# Patient Record
Sex: MALE | Race: WHITE
[De-identification: names, ages, dates, MRNs, and addresses within clinical notes are randomized per-mention and may not be internally consistent; named-entity substitution may affect disease eponyms.]

---

## 2021-02-01 ENCOUNTER — HOSPITAL ENCOUNTER (EMERGENCY)
Dept: HOSPITAL 53 - M ED | Age: 46
Discharge: HOME | End: 2021-02-01
Payer: COMMERCIAL

## 2021-02-01 VITALS — SYSTOLIC BLOOD PRESSURE: 140 MMHG | DIASTOLIC BLOOD PRESSURE: 65 MMHG

## 2021-02-01 DIAGNOSIS — Z79.899: ICD-10-CM

## 2021-02-01 DIAGNOSIS — F33.9: ICD-10-CM

## 2021-02-01 DIAGNOSIS — F41.9: ICD-10-CM

## 2021-02-01 DIAGNOSIS — R39.11: Primary | ICD-10-CM

## 2021-02-01 NOTE — CCD
Summarization Of Episode

                             Created on: 2021



ANITHA SILVER

External Reference #: [id number]

: 1975

Sex: Male



Demographics





                          Address                   [address] [place] [address]

 

                          Home Phone                [phone]

 

                          Preferred Language        English

 

                          Marital Status            

 

                          Temple Affiliation     [place]

 

                          Race                      White

 

                          Ethnic Group              Not  or 





Author





                          Author                    [facility]

 

                          Organization              [facility]

 

                          Address                   Unknown

 

                          Phone                     Unavailable







Support





                Name            Relationship    Address         Phone

 

                DISABLED        Next Of Kin     Unknown         Unavailable

 

                    SONNY SILVER Next Of Kin         [address] [place]  [address]                    [phone]

 

                UE              Next Of Kin     Unknown         Unavailable

 

                    SAPPHIRE SILVER    Next Of Kin         [address] [place]  [address]                    [phone]

 

                    ANJALI SILVER      Next Of Kin         [address] [place]  [address]                    [phone]

 

                    BHAVANI SILVER                [address] [place]  [address]                    Unavailable







Care Team Providers





                    Care Team Member Name Role                Phone

 

                    SYMENOW, G CHRISTOPHER PA Unavailable         Unavailable

 

                    SYMENOW, G CHRISTOPHER PA Unavailable         Unavailable

 

                    SYMENOW, G CHRISTOPHER PA Unavailable         Unavailable

 

                    SYMENOW, G CHRISTOPHER PA Unavailable         Unavailable

 

                    SYMENOW, G CHRISTOPHER PA Unavailable         Unavailable

 

                    SYMENOW, G CHRISTOPHER PA Unavailable         Unavailable

 

                    SYMENOW, G CHRISTOPHER PA Unavailable         Unavailable

 

                    SYMENOW, G CHRISTOPHER PA Unavailable         Unavailable

 

                    SYMENOW, G CHRISTOPHER PA Unavailable         Unavailable

 

                    SYMENOW, G CHRISTOPHER PA Unavailable         Unavailable

 

                    SYMENOW, G CHRISTOPHER PA Unavailable         Unavailable

 

                    SYMENOW, G CHRISTOPHER PA Unavailable         Unavailable

 

                    SYMENOW, G CHRISTOPHER PA Unavailable         Unavailable

 

                    SYMENOW, G CHRISTOPHER PA Unavailable         Unavailable

 

                    SYMENOW, G CHRISTOPHER PA Unavailable         Unavailable

 

                    SYMENOW, G CHRISTOPHER PA Unavailable         Unavailable

 

                    SYMENOW, G CHRISTOPHER PA Unavailable         Unavailable

 

                    HODANEVA PA Unavailable         Unavailable

 

                    HODANEVA PA Unavailable         Unavailable

 

                    HODANEVA PA Unavailable         Unavailable

 

                    HODANEVA PA Unavailable         Unavailable

 

                    HODANEVA PA Unavailable         Unavailable

 

                    HODANEVA PA Unavailable         Unavailable

 

                    HODANEVA PA Unavailable         Unavailable

 

                    HODANEVA PA Unavailable         Unavailable

 

                    HODANEVA PA Unavailable         Unavailable

 

                    HODANEVA PA Unavailable         Unavailable

 

                    HODANEVA PA Unavailable         Unavailable

 

                    HODANEVA PA Unavailable         Unavailable

 

                    HODANEVA PA Unavailable         Unavailable

 

                    HODAN, L TOM PA Unavailable         Unavailable

 

                    HODAN, L TOM PA Unavailable         Unavailable

 

                    HODAN, L TOM PA Unavailable         Unavailable

 

                    HODAN, L TOM PA Unavailable         Unavailable

 

                    HODAN, L TOM PA Unavailable         Unavailable

 

                    HODAN, L TOM PA Unavailable         Unavailable

 

                    DOMINGO,  RUBY PA  Unavailable         Unavailable

 

                    DOMINGO,  RUBY PA  Unavailable         Unavailable

 

                    DOMINGO,  RUBY PA  Unavailable         Unavailable

 

                    DOMINGO,  RUBY PA  Unavailable         Unavailable

 

                    DOMINGO,  RUBY PA  Unavailable         Unavailable

 

                    DOMINGO,  RUBY PA  Unavailable         Unavailable

 

                    DOMINGO,  RUBY PA  Unavailable         Unavailable

 

                    DOMINGO,  RUBY PA  Unavailable         Unavailable

 

                    DOMINGO,  RUBY PA  Unavailable         Unavailable

 

                    DOMINGO,  RUBY PA  Unavailable         Unavailable

 

                    DOMINGO,  RUBY PA  Unavailable         Unavailable

 

                    DOMINGO,  RUBY PA  Unavailable         Unavailable

 

                    DOMINGO,  RUBY PA  Unavailable         Unavailable

 

                    DOMINGO,  RUBY PA  Unavailable         Unavailable

 

                    DOMINGO,  RUBY PA  Unavailable         Unavailable

 

                    EVA KASPER MD Unavailable         Unavailable

 

                    EVA KASPER MD Unavailable         Unavailable

 

                    EVA KASPER MD Unavailable         Unavailable

 

                    MARIE, W SCOTT PA Unavailable         Unavailable

 

                    MARIE, W SCOTT PA Unavailable         Unavailable

 

                    MARIE, W SCOTT PA Unavailable         Unavailable

 

                    MARIE, W SCOTT PA Unavailable         Unavailable

 

                    MARIE, W SCOTT PA Unavailable         Unavailable

 

                    MARIE, W SCOTT PA Unavailable         Unavailable

 

                    MARIE, W SCOTT PA Unavailable         Unavailable

 

                    MARIE, W SCOTT PA Unavailable         Unavailable

 

                    MARIE, W SCOTT PA Unavailable         Unavailable

 

                    MARIE, W SCOTT PA Unavailable         Unavailable

 

                    MARIE, W SCOTT PA Unavailable         Unavailable

 

                    MARIE, W SCOTT PA Unavailable         Unavailable

 

                    MARIE, W SCOTT PA Unavailable         Unavailable

 

                    MARIE, W SCOTT PA Unavailable         Unavailable

 

                    MARIE, W SCOTT PA Unavailable         Unavailable

 

                    MARIE, W SCOTT PA Unavailable         Unavailable

 

                    MARIE, W SCOTT PA Unavailable         Unavailable

 

                    MARIE, W SCOTT PA Unavailable         Unavailable

 

                    MARIE, W SCOTT PA Unavailable         Unavailable

 

                    MARIE, W SCOTT PA Unavailable         Unavailable

 

                    MARIE, W SCOTT PA Unavailable         Unavailable

 

                    MARIE, W SCOTT PA Unavailable         Unavailable

 

                    MARIE, W SCOTT PA Unavailable         Unavailable

 

                    MARIE, W SCOTT PA Unavailable         Unavailable

 

                    MARIE, W SCOTT PA Unavailable         Unavailable

 

                    MARIE, W SCOTT PA Unavailable         Unavailable

 

                    MARIE, W SCOTT PA Unavailable         Unavailable

 

                    MARIE, W SCOTT PA Unavailable         Unavailable

 

                    MAIRE, W SCOTT PA Unavailable         Unavailable

 

                    MARIE, W SCOTT PA Unavailable         Unavailable

 

                    MARIE, W SCOTT PA Unavailable         Unavailable

 

                    MARIE, W SCOTT PA Unavailable         Unavailable

 

                    MARIE, W SCOTT PA Unavailable         Unavailable

 

                    MARIE, W SCOTT PA Unavailable         Unavailable

 

                    MARIE, W SCOTT PA Unavailable         Unavailable

 

                    MARIE, W SCOTT PA Unavailable         Unavailable

 

                    MARIE, W SCOTT PA Unavailable         Unavailable

 

                    MARIE, W SCOTT PA Unavailable         Unavailable

 

                    MARIE, W SCOTT PA Unavailable         Unavailable

 

                    MARIE, W SCOTT PA Unavailable         Unavailable

 

                    MARIE, W SCOTT PA Unavailable         Unavailable

 

                    MARIE, W SCOTT PA Unavailable         Unavailable

 

                    MARIE, W SCOTT PA Unavailable         Unavailable

 

                    MARIE, W SCOTT PA Unavailable         Unavailable

 

                    MARIE, W SCOTT PA Unavailable         Unavailable

 

                    MARIE, W SCOTT PA Unavailable         Unavailable

 

                    ASHLY, LUIS TYLER PA Unavailable         Unavailable

 

                    ASHLY, LUIS TYLER PA Unavailable         Unavailable

 

                    ASHLY, LUIS TYLER PA Unavailable         Unavailable

 

                    ASHLY, LUIS TYLER PA Unavailable         Unavailable

 

                    ASHLY, LUIS TYLER PA Unavailable         Unavailable

 

                    ASHLY, LUIS TYLER PA Unavailable         Unavailable

 

                    ASHLY, LUIS TYLER PA Unavailable         Unavailable

 

                    ASHLY, LUIS TYLER PA Unavailable         Unavailable

 

                    ASHLY, LUIS TYLER PA Unavailable         Unavailable

 

                    ASHLY, LUIS TYLER PA Unavailable         Unavailable

 

                    ASHLY, LUIS TYLER PA Unavailable         Unavailable

 

                    ASHLY, LUIS TYLER PA Unavailable         Unavailable

 

                    ASHLY, LUIS TYLER PA Unavailable         Unavailable

 

                    ASHLY, LUIS TYLER PA Unavailable         Unavailable

 

                    ASHLY, LUIS TYLER PA Unavailable         Unavailable

 

                    ASHLY, LUIS TYLER PA Unavailable         Unavailable

 

                    ASHLY, LUIS TYLER PA Unavailable         Unavailable

 

                    ASHLY, LUIS TYLER PA Unavailable         Unavailable

 

                    ASHLY, LUIS TYLER PA Unavailable         Unavailable

 

                    ASHLY, LUIS TYLER PA Unavailable         Unavailable

 

                    ASHLY, LUIS TYLER PA Unavailable         Unavailable

 

                    TACHO KHAN MD    Unavailable         Unavailable

 

                    TACHO KHAN MD    Unavailable         Unavailable

 

                    TACHO KHAN MD    Unavailable         Unavailable

 

                    TACHO KHAN MD    Unavailable         Unavailable

 

                    TACHO KHAN MD    Unavailable         Unavailable

 

                    TACHO KHAN MD    Unavailable         Unavailable

 

                    TACHO KHAN MD    Unavailable         Unavailable

 

                    TACHO KHAN MD    Unavailable         Unavailable

 

                    TACHO KHAN MD    Unavailable         Unavailable

 

                    OLIVAREZ SR, ANITHA CABRAL MD Unavailable         Unavailable

 

                    OLIVAREZ SR, ANITHA CABRAL MD Unavailable         Unavailable

 

                    OLIVAREZ SR, ANITHA CABRAL MD Unavailable         Unavailable

 

                    OLIVAREZ SR, ANITHA CABRAL MD Unavailable         Unavailable

 

                    OLIVAREZ SR, ANITHA CABRAL MD Unavailable         Unavailable

 

                    OLIVAREZ SR, ANITHA CABRAL MD Unavailable         Unavailable

 

                    OLIVAREZ SR, ANITHA CABRAL MD Unavailable         Unavailable

 

                    OLIVAREZ SR, ANITHA CABRAL MD Unavailable         Unavailable

 

                    OLIVAREZ SR, ANITHA CABRAL MD Unavailable         Unavailable

 

                    OLIVAREZ SR, ANITHA CABRAL MD Unavailable         Unavailable

 

                    OLIVAREZ SR, ANITHA CABRAL MD Unavailable         Unavailable

 

                    OLIVAREZ SR, ANITHA CABRAL MD Unavailable         Unavailable

 

                    OLIVAREZ SR, ANITHA CABRAL MD Unavailable         Unavailable

 

                    OLIVAREZ SR, ANITHA CABRAL MD Unavailable         Unavailable

 

                    OLIVAREZ SR, ANITHA CABRAL MD Unavailable         Unavailable

 

                    OLIVAREZ SR, ANITHA CABRAL MD Unavailable         Unavailable

 

                    OLIVAREZ SR, ANITHA CABRAL MD Unavailable         Unavailable

 

                    OLIVAREZ SR, ANITHA CABRAL MD Unavailable         Unavailable

 

                    OLIVAREZ SR, ANITHA CABRAL MD Unavailable         Unavailable

 

                    OLIVAREZ SR, ANITHA CABRAL MD Unavailable         Unavailable

 

                    OLIVAREZ SR, ANITHA CABRAL MD Unavailable         Unavailable

 

                    OLIVAREZ SR, ANITHA CABRAL MD Unavailable         Unavailable

 

                    OLIVAREZ SR, ANITHA CABRAL MD Unavailable         Unavailable

 

                    OLIVAREZ SR, ANITHA CABRAL MD Unavailable         Unavailable

 

                    OLIVAREZ SR, ANITHA CABRAL MD Unavailable         Unavailable

 

                    OLIVAREZ SR, ANITHA CABRAL MD Unavailable         Unavailable

 

                    OLIVAREZ SR, ANITHA CABRAL MD Unavailable         Unavailable

 

                    OLIVAREZ SR, ANITHA CABRAL MD Unavailable         Unavailable

 

                    OLIVAREZ SR, ANITHA CABRAL MD Unavailable         Unavailable

 

                    OLIVAREZ SR, ANITHA CABRAL MD Unavailable         Unavailable

 

                    OLIVAREZ SR, ANITHA CABRAL MD Unavailable         Unavailable

 

                    OLIVAREZ SR, ANITHA CABRAL MD Unavailable         Unavailable

 

                    OLIVAREZ SR, ANITHA CABRAL MD Unavailable         Unavailable

 

                    OLIVAREZ SR, ANITHA CABRAL MD Unavailable         Unavailable

 

                    OLIVAREZ SR, ANITHA CABRAL MD Unavailable         Unavailable

 

                    OLIVAREZ SR, ANITHA CABRAL MD Unavailable         Unavailable

 

                    OLIVAREZ SR, ANITHA CABRAL MD Unavailable         Unavailable

 

                    OLIVAREZ SR, ANITHA CABRAL MD Unavailable         Unavailable

 

                    OLIVAREZ SR, ANITHA CABRAL MD Unavailable         Unavailable

 

                    OLIVAREZ SR, ANITHA CABRAL MD Unavailable         Unavailable

 

                    OLIVAREZ SR, ANITHA CABRAL MD Unavailable         Unavailable

 

                    OLIVAREZ SR, ANITHA CABRAL MD Unavailable         Unavailable

 

                    OLIVAREZ SR, ANITHA CABRAL MD Unavailable         Unavailable

 

                    OLIVAREZ SR, ANITHA CABRAL MD Unavailable         Unavailable

 

                    OLIVAREZ SR, ANITHA CABRAL MD Unavailable         Unavailable

 

                    OLIVAREZ SR, ANITHA CABRAL MD Unavailable         Unavailable

 

                    OLIVAREZ SR, ANITHA CABRAL MD Unavailable         Unavailable

 

                    OLIVAREZ SR, ANITHA CABRAL MD Unavailable         Unavailable

 

                    OLIVAREZ SR, ANITHA CABRAL MD Unavailable         Unavailable

 

                    OLIVAREZ SR, ANITHA CABRAL MD Unavailable         Unavailable

 

                    OLIVAREZ SR, ANITHA CABRAL MD Unavailable         Unavailable

 

                    OLIVAREZ SR, ANITHA CABRAL MD Unavailable         Unavailable

 

                    OLIVAREZ SR, ANITHA CABRAL MD Unavailable         Unavailable

 

                    OLIVAREZ SR, ANITHA CABRAL MD Unavailable         Unavailable

 

                    Braxton, M Breana RPA   Unavailable         Unavailable

 

                    Braxton, M Breana RPA   Unavailable         Unavailable

 

                    Braxton, M Breana RPA   Unavailable         Unavailable

 

                    Braxton, M Breana RPA   Unavailable         Unavailable

 

                    Braxton, M Breana RPA   Unavailable         Unavailable

 

                    Braxton, M Breana RPA   Unavailable         Unavailable

 

                    Braxton, M Breana RPA   Unavailable         Unavailable

 

                    Braxton, M Breana RPA   Unavailable         Unavailable

 

                    Braxton, M Breana RPA   Unavailable         Unavailable

 

                    Braxton, M Breana RPA   Unavailable         Unavailable

 

                    Braxton, M Breana RPA   Unavailable         Unavailable

 

                    Braxton, M Breana RPA   Unavailable         Unavailable

 

                    Braxton, M Breana RPA   Unavailable         Unavailable

 

                    Braxton, M Breana RPA   Unavailable         Unavailable

 

                    Braxton, M Breana RPA   Unavailable         Unavailable

 

                    Braxton, M Breana RPA   Unavailable         Unavailable

 

                    Braxton, M Breana RPA   Unavailable         Unavailable

 

                    Braxton, M Breana RPA   Unavailable         Unavailable

 

                    Braxton, M Breana RPA   Unavailable         Unavailable

 

                    Braxton, M Breana RPA   Unavailable         Unavailable

 

                    Braxton, M Breana RPA   Unavailable         Unavailable

 

                    Braxton, M Breana RPA   Unavailable         Unavailable

 

                    Braxton, M Breana RPA   Unavailable         Unavailable

 

                    Braxton, M Breana RPA   Unavailable         Unavailable

 

                    Braxton, M Breana RPA   Unavailable         Unavailable

 

                    Braxton, M Breana RPA   Unavailable         Unavailable

 

                    Braxton, M Breana RPA   Unavailable         Unavailable

 

                    Braxton, M Breana RPA   Unavailable         Unavailable

 

                    Braxton, M Breana RPA   Unavailable         Unavailable

 

                    Braxton, M Breana RPA   Unavailable         Unavailable

 

                    Braxton, M Breana RPA   Unavailable         Unavailable

 

                    Braxton, M Breana RPA   Unavailable         Unavailable

 

                    Braxton, M Breana RPA   Unavailable         Unavailable

 

                    Braxton, M Breana RPA   Unavailable         Unavailable

 

                    Braxton, M Breana RPA   Unavailable         Unavailable

 

                    Braxton, M Breana RPA   Unavailable         Unavailable

 

                    Braxton, M Breana RPA   Unavailable         Unavailable

 

                    Braxton, M Breana RPA   Unavailable         Unavailable

 

                    Braxton, M Breana RPA   Unavailable         Unavailable

 

                    Braxton, M Breana RPA   Unavailable         Unavailable

 

                    Braxton, M Breana RPA   Unavailable         Unavailable

 

                    JOS VALENTINE MD Unavailable         Unavailable

 

                    JOS VALENTINE MD Unavailable         Unavailable

 

                    JOS VALENTINE MD Unavailable         Unavailable

 

                    JOS VALENTINE MD Unavailable         Unavailable

 

                    JOS VALENTINE MD Unavailable         Unavailable

 

                    JOS VALENTINE MD Unavailable         Unavailable

 

                    JOS VALENTINE MD Unavailable         Unavailable

 

                    JOS VALENTINE MD Unavailable         Unavailable

 

                    JOS VALENTINE MD Unavailable         Unavailable

 

                    JOS VALENTINE MD Unavailable         Unavailable

 

                    MEEK, SYED NICOLE FNP-C, MSN Unavailable         Unavailab

le

 

                    MEEK, SYED NICOLE FNP-C, MSN Unavailable         Unavailab

le

 

                    MEEK, SYED NICOLE FNP-C, MSN Unavailable         Unavailab

le

 

                    MEEK, SYED NICOLE FNP-C, MSN Unavailable         Unavailab

le

 

                    MEEK, SYED NICOLE FNP-C, MSN Unavailable         Unavailab

le

 

                    MEEK, SYED NICOLE FNP-C, MSN Unavailable         Unavailab

le

 

                    MEEK, SYED NICOLE FNP-C, MSN Unavailable         Unavailab

le

 

                    MEEK, SYED NICOLE FNP-C, MSN Unavailable         Unavailab

le

 

                    MEEK, SYED NICOLE FNP-C, MSN Unavailable         Unavailab

le

 

                    MEEK, SYED NICOLE FNP-C, MSN Unavailable         Unavailab

le

 

                    MEEK, SYED NICOLE FNP-C, MSN Unavailable         Unavailab

le

 

                    MEEK, SYED NICOLE FNP-C, MSN Unavailable         Unavailab

le

 

                    MEEK, SYED NICOLE FNP-C, MSN Unavailable         Unavailab

le

 

                    MEEK, SYED NICOLE FNP-C, MSN Unavailable         Unavailab

le

 

                    MEEK, SYED NICOLE FNP-C, MSN Unavailable         Unavailab

le

 

                    MEEK, SYED NICOLE FNP-C, MSN Unavailable         Unavailab

le

 

                    MEEK, SYED NICOLE FNP-C, MSN Unavailable         Unavailab

le

 

                    MEEK, SYED NICOLE FNP-C, MSN Unavailable         Unavailab

le

 

                    MEEK, SYED NICOLE FNP-C, MSN Unavailable         Unavailab

le

 

                    MEEK, SYED NICOLE FNP-C, MSN Unavailable         Unavailab

le

 

                    MEEK, SYED NICOLE FNP-C, MSN Unavailable         Unavailab

le

 

                    MEEK, SYED NICOLE FNP-C, MSN Unavailable         Unavailab

le

 

                    MEEK, SYED NICOLE FNP-C, MSN Unavailable         Unavailab

le

 

                    MEEK, SYED NICOLE FNP-C, MSN Unavailable         Unavailab

le

 

                    MEEK, SYED NICOLE FNP-C, MSN Unavailable         Unavailab

le

 

                    MEEK, SYED NICOLE FNP-C, MSN Unavailable         Unavailab

le

 

                    MEEK, SYED NICOLE FNP-C, MSN Unavailable         Unavailab

le

 

                    MEEK, SYED NICOLE FNP-C, MSN Unavailable         Unavailab

le

 

                    MEEK, SYED NICOLE FNP-C, MSN Unavailable         Unavailab

le

 

                    MEEK, SYED NICOLE FNP-C, MSN Unavailable         Unavailab

le

 

                    MEEK, SYED NICOLE FNP-C, MSN Unavailable         Unavailab

le

 

                    MEEK, SYED NICOLE FNP-C, MSN Unavailable         Unavailab

le

 

                    MEEK, SYED NICOLE FNP-C, MSN Unavailable         Unavailab

le

 

                    MEEK, SYED NICOLE FNP-C, MSN Unavailable         Unavailab

le

 

                    MEEK, SYED NICOLE FNP-C, MSN Unavailable         Unavailab

le

 

                    MEEK, SYED NICOLE FNP-C, MSN Unavailable         Unavailab

le

 

                    MEEK, SYED NICOLE FNP-C, MSN Unavailable         Unavailab

le

 

                    MEEK, SYED NICOLE FNP-C, MSN Unavailable         Unavailab

le

 

                    MEEKSYED GAMBINOA FNP-C, MSN Unavailable         Unavailab

le

 

                    MEEKSYED GAMBINOA FNP-C, MSN Unavailable         Unavailab

le

 

                    MEEK, SYED JORDANA FNP-C, MSN Unavailable         Unavailab

le

 

                    MEEK, SYED JORDANA FNP-C, MSN Unavailable         Unavailab

le

 

                    MEEK, SYED JORDANA FNP-C, MSN Unavailable         Unavailab

le

 

                    ABBY, DUANE THOMAS PA-C Unavailable         Unavailable

 

                    ABBY, DUANE THOMAS PA-C Unavailable         Unavailable

 

                    ABBY, DUANE THOMAS PA-C Unavailable         Unavailable

 

                    ABBY, DUANE THOMAS PA-C Unavailable         Unavailable

 

                    ABBY, DUANE THOMAS PA-C Unavailable         Unavailable

 

                    ABBY, DUANE THOMAS PA-C Unavailable         Unavailable

 

                    ABBY, DUANE THOMAS PA-C Unavailable         Unavailable

 

                    ABBY, DUANE THOMAS PA-C Unavailable         Unavailable

 

                    ABBY, DUANE THOMAS PA-C Unavailable         Unavailable

 

                    [facility]        Unavailable         Unavailable

 

                    DESJARLAIS,  GEORGE NP Unavailable         Unavailable

 

                    DESJARLAIS,  GEORGE NP Unavailable         Unavailable

 

                    DESJARLAIS,  GEORGE NP Unavailable         Unavailable

 

                    DESJARLAIS,  GEORGE NP Unavailable         Unavailable

 

                    DESJARLAIS,  GEORGE NP Unavailable         Unavailable

 

                    DESJARLAIS,  GEORGE NP Unavailable         Unavailable

 

                    DESJARLAIS,  GEORGE NP Unavailable         Unavailable

 

                    DESJARLAIS,  GEORGE NP Unavailable         Unavailable

 

                    DESJARLAIS,  GEORGE NP Unavailable         Unavailable

 

                    Rydberg,  Anahi PA Unavailable         Unavailable

 

                    Rydberg,  Anahi PA Unavailable         Unavailable

 

                    Rydberg,  Anahi PA Unavailable         Unavailable

 

                    Rydberg,  Anahi PA Unavailable         Unavailable

 

                    Rydberg,  Anahi PA Unavailable         Unavailable

 

                    Rydberg,  Anahi PA Unavailable         Unavailable

 

                    Rydberg,  Anahi PA Unavailable         Unavailable

 

                    Rydberg,  Anahi PA Unavailable         Unavailable

 

                    Rydberg,  Anahi PA Unavailable         Unavailable

 

                    Rydberg,  Anahi PA Unavailable         Unavailable

 

                    Rydberg,  Anahi PA Unavailable         Unavailable

 

                    Rydberg,  Anahi PA Unavailable         Unavailable

 

                    Rydberg,  Anahi PA Unavailable         Unavailable

 

                    Rydberg,  Anahi PA Unavailable         Unavailable

 

                    Rydberg,  Anahi PA Unavailable         Unavailable

 

                    Rydberg,  Anahi PA Unavailable         Unavailable

 

                    Rydberg,  Anahi PA Unavailable         Unavailable

 

                    Rydberg,  Anahi PA Unavailable         Unavailable

 

                    Rydberg,  Anahi PA Unavailable         Unavailable

 

                    Rydberg,  Anahi PA Unavailable         Unavailable

 

                    Rydberg,  Anahi PA Unavailable         Unavailable

 

                    Rydberg,  Anahi PA Unavailable         Unavailable



                                  



Re-disclosure Warning

          The records that you are about to access may contain information from 
federally-assisted alcohol or drug abuse programs. If such information is 
present, then the following federally mandated warning applies: This information
has been disclosed to you from records protected by federal confidentiality 
rules (42 CFR part 2). The federal rules prohibit you from making any further 
disclosure of this information unless further disclosure is expressly permitted 
by the written consent of the person to whom it pertains or as otherwise 
permitted by 42 CFR part 2. A general authorization for the release of medical 
or other information is NOT sufficient for this purpose. The Federal rules 
restrict any use of the information to criminally investigate or prosecute any 
alcohol or drug abuse patient.The records that you are about to access may 
contain highly sensitive health information, the redisclosure of which is 
protected by Article 27-F of the [facility] Public Health law. If you 
continue you may have access to information: Regarding HIV / AIDS; Provided by 
facilities licensed or operated by the [facility] Office of Mental Health; 
or Provided by the [facility] Office for People With Developmental 
Disabilities. If such information is present, then the following [place] 
mandated warning applies: This information has been disclosed to you from 
confidential records which are protected by state law. State law prohibits you 
from making any further disclosure of this information without the specific 
written consent of the person to whom it pertains, or as otherwise permitted by 
law. Any unauthorized further disclosure in violation of state law may result in
a fine or [facility] sentence or both. A general authorization for the release of 
medical or other information is NOT sufficient authorization for further disc
losure.                                                                         
    



Allergies and Adverse Reactions

          



           Type       Description Substance  Reaction   Status     Data Source(s

)

 

           Drug allergy MDX - Nka - No Known Allergies MDX - Nka - No Known [facility]



                                                                                
       



Encounters

          



           Encounter  Providers  Location   Date       Indications Data Source(s

)

 

           Outpatient Attender: GEORGE MADSEN NP            2021 11:

33:00 AM [facility]

 

           Outpatient Attender: GEORGE MADSEN NP            2020 01:

20:00 PM [facility]

 

           Outpatient            [facility] 12/10/2020 12:00:00 

AM EST            eCW1 ([facility])

 

           Outpatient Attender: GEORGE MADSEN NP            2020 09:

22:00 AM [facility]

 

           Outpatient Attender: GEORGE MADSEN NP            2020 11:

40:00 AM [facility]

 

           Outpatient Attender: GEORGE MADSEN NP            2020 10:

00:00 AM [facility]

 

           Outpatient Attender: MARIANNA OLIVAREZ SR            2020 10:36:00 

AM [facility]

 

                    Emergency           Attender: RUBY LANE PAReferrer: MIKEL OLIVAREZ SR EMERGENCY ROOM-ER

                    10/27/2020 07:34:00 AM EDT - 10/27/2020 08:51:00 AM [facility]

 

                                        Patient discharged. 

 

           Outpatient Attender: GEORGE MADSEN NP            10/20/2020 11:

40:00 AM [facility]

 

                Outpatient      Attender: MARIANNA OLIVAREZ SR                 10/12

/2020 02:58:00 PM EDT - 10/28/2020 

10:36:00 AM [facility]

 

                                        Patient discharged. 

 

                Outpatient      Attender: MARIANNA OLIVAREZ SRReferrer: MARIANNA SUNG SR                 10/12/2020 

02:06:00 PM EDT - 10/12/2020 02:06:00 PM EDT                           [facility]

 

                    Outpatient          Attender: MARIANNA OLIVAREZ SRReferrer: LEONARD CARRION MSN 

EMERGENCY ROOM-[facility] 10/08/2020 12:43:00 PM EDT - 10/08/2020 12:43:00 PM [facility]

 

           Outpatient            [facility] 10/08/2020 12:00:00 

AM EDT            eCW1 ([facility])

 

                    Emergency           Attender: TYLER LIMON PAReferrer: MERLYN DEL REAL, MSN 

EMERGENCY ROOM-ER   10/04/2020 05:42:00 AM EDT - 10/04/2020 06:05:00 AM [facility]

 

                                        Patient discharged. 

 

           Outpatient Attender: GEORGE MADSEN NP            10/01/2020 11:

40:00 AM [facility]

 

           Outpatient Attender: GEORGE MADSEN NP            2020 11:

40:00 AM [facility]

 

                          Emergency                 Attender: TOM PETTIT FABRICE

ttender: CHRISTOPHER SYMENOW PAReferrer: 

NICOLE DEL REAL, MSN  EMERGENCY ROOM-ER         2020 08:30:00 PM EDT -

 

2020 08:58:00 PM [facility]

 

                                        Patient discharged. 

 

                Outpatient      Attender: CHRISTOPHER SYMENOW PAConsultant: Alexae

 [facility]    

2020 07:55:00 PM [facility]

 

           Outpatient Attender: GEORGE MADSEN NP            2020 11:

40:00 AM [facility]

 

           Outpatient Attender: GEORGE MIKIRLAIS NP            2020 11:

40:00 AM [facility]

 

           Outpatient Attender: GEORGE MIKIRLAIS NP            2020 01:

00:00 PM [facility]

 

           Outpatient            [facility] 2020 12:00:00 

AM [facility]            eCW1 ([facility])

 

           Outpatient Attender: THOMAS MORA PA-C            2020 11:22:00

 AM [facility]

 

           Outpatient Attender: GEORGE MIKIRLAIS NP            2020 11:

00:00 AM [facility]

 

           Outpatient Attender: GEORGE NIKOLAIJARLAIS NP            2020 11:

20:00 AM [facility]

 

           Outpatient Attender: Breana Limon [facility]   2020 07:35:42 PM

 [facility]

 

           Outpatient Attender: Breana Limon [facility]   2020 12:10:22 AM

 [facility]

 

           Outpatient Attender: GEORGE MIKIRLAIS NP            2020 01:

00:00 PM [facility]

 

           Outpatient Attender: GEORGE DESJARLAIS NP            2020 11:

00:00 AM [facility]

 

           Outpatient Attender: GEORGE DESJARLAIS NP            2020 02:

18:00 PM [facility]

 

           Outpatient Attender: GEORGE NIKOLAIJARLAIS NP            2020 11:

00:00 AM [facility]

 

           Outpatient Attender: GEORGE NIKOLAIJARLAIS NP            03/10/2020 10:

40:00 AM [facility]

 

           Outpatient Attender: GEORGE NIKOLAIJARLAIS NP            2020 12:

59:00 PM [facility]

 

           Outpatient Attender: GEORGE MADSEN NP            2020 10:

34:00 AM [facility]

 

           Outpatient Attender: GEORGE MADSEN NP            2019 02:

00:00 PM [facility]

 

           Outpatient Attender: GEORGE MADSEN NP            11/15/2019 01:

59:00 PM [facility]

 

                Emergency       Attender: RUBY ROSADO                  12:17:00 PM EST - 2015 

12:30:00 PM [facility]

 

                Emergency       Attender: TACHO KHAN MD                 

015 04:15:00 PM [facility] 2015 

07:15:00 PM [facility]

 

                Inpatient       Attender: SCOTT Bellitter: JOS LARSON MD                 2014 

12:40:00 PM EST - 2014 01:22:00 AM [facility]

pital

 

                Emergency       Attender: CAPRICE KASPER MD                  06:14:00 PM [facility] 2013

08:39:00 PM [facility]

 

                Outpatient      Attender: Anahi Silva PAReferrer: Anahi ROSADO                 2013 

02:32:00 PM [facility]



                                                                                
                                                                                
                                                                                
                                                                                
                                                                                
                                                                                
  



Medications

          



          Medication Brand Name Start Date Product Form Dose      Route     Admi

nistrative 

Instructions Pharmacy Instructions Status     Indications Reaction   Description

 Data 

Source(s)

 

                atorvastatin 20 MG Oral Tablet ATORVASTATIN CALCIUM 10/09/2020 1

2:00:00 AM EDT 

tablet          90                              TAKE ONE TABLET BY MOUTH EVERY D

AY TAKE ONE TABLET BY MOUTH EVERY 

DAY          SOLD: 10/10/2020                                        [facility]

s

 

                atorvastatin 20 MG Oral Tablet ATORVASTATIN CALCIUM 10/09/2020 1

2:00:00 AM EDT 

tablet          90                              TAKE ONE TABLET BY MOUTH EVERY D

AY TAKE ONE TABLET BY MOUTH EVERY 

DAY          SOLD: 2021                                        [facility]

s

 

       physical therapy eval and tx - UNK    10/08/2020 12:00:00 AM EDT         

                           active  

                                        physical therapy eval and tx - eCW1 ([facility])

 

       physical therapy eval and tx - UNK    10/08/2020 12:00:00 AM EDT         

                           active  

                                        physical therapy eval and tx - eCW1 ([facility])

 

       physical therapy eval and tx - UNK    10/08/2020 12:00:00 AM EDT         

                           active  

                                        physical therapy eval and tx - eCW1 ([facility])

 

       physical therapy eval and tx - UNK    10/08/2020 12:00:00 AM EDT         

                           active  

                                        physical therapy eval and tx - eCW1 ([facility])

 

                    atorvastatin 20 MG Oral Tablet [Lipitor] Lipitor 20 MG Lipit

or 20 MG       10/08/2020 

12:00:00 AM EDT        1.0 {tablet}                      active               Li

pitor 20 MG eCW1 ([facility])

 

                    atorvastatin 20 MG Oral Tablet [Lipitor] Lipitor 20 MG Lipit

or 20 MG       10/08/2020 

12:00:00 AM EDT        1.0 {tablet}                      active               Li

pitor 20 MG eCW1 ([facility])

 

                          Tamsulosin hydrochloride 0.4 MG Oral Capsule [Flomax] 

Flomax 0.4 MG Flomax 0.4 

MG    10/08/2020 12:00:00 AM EDT       1.0 {capsule}                   active   

          Flomax 0.4 MG 

eCW1 ([facility])

 

       physical therapy eval and tx - UNK    10/08/2020 12:00:00 AM EDT         

                           active  

                                        physical therapy eval and tx - eCW1 ([facility])

 

                          Tamsulosin hydrochloride 0.4 MG Oral Capsule [Flomax] 

Flomax 0.4 MG Flomax 0.4 

MG    10/08/2020 12:00:00 AM EDT       1.0 {capsule}                   active   

          Flomax 0.4 MG 

eCW1 ([facility])

 

          100 mg              10/07/2020 12:00:00 AM EDT tablet    30           

       TAKE ONE TABLET BY MOUTH EVERY 

DAY        TAKE ONE TABLET BY MOUTH EVERY DAY SOLD: 12/10/2020                  

                [facility]

 

          100 mg              10/07/2020 12:00:00 AM EDT tablet    30           

       TAKE ONE TABLET BY MOUTH EVERY 

DAY        TAKE ONE TABLET BY MOUTH EVERY DAY SOLD: 10/08/2020                  

                [facility]

 

          100 mg              10/07/2020 12:00:00 AM EDT tablet    30           

       TAKE ONE TABLET BY MOUTH EVERY 

DAY        TAKE ONE TABLET BY MOUTH EVERY DAY SOLD: 2021                  

                [facility]

 

          100 mg              10/07/2020 12:00:00 AM EDT tablet    30           

       TAKE ONE TABLET BY MOUTH EVERY 

DAY        TAKE ONE TABLET BY MOUTH EVERY DAY SOLD: 2020                  

                [facility]

 

                quetiapine 100 MG Oral Tablet QUETIAPINE FUMARATE 10/05/2020 12:

00:00 AM EDT 

tablet          60                              TAKE TWO TABLETS BY MOUTH AT BED

TIME TAKE TWO TABLETS BY MOUTH AT 

BEDTIME      SOLD: 10/07/2020                                        [facility]

s

 

                quetiapine 100 MG Oral Tablet QUETIAPINE FUMARATE 10/05/2020 12:

00:00 AM EDT 

tablet          60                              TAKE TWO TABLETS BY MOUTH AT BED

TIME TAKE TWO TABLETS BY MOUTH AT 

BEDTIME      SOLD: 12/10/2020                                        [facility]

s

 

                quetiapine 100 MG Oral Tablet QUETIAPINE FUMARATE 10/05/2020 12:

00:00 AM EDT 

tablet          60                              TAKE TWO TABLETS BY MOUTH AT BED

TIME TAKE TWO TABLETS BY MOUTH AT 

BEDTIME      SOLD: 2021                                        [facility]

s

 

                quetiapine 100 MG Oral Tablet QUETIAPINE FUMARATE 10/05/2020 12:

00:00 AM EDT 

tablet          60                              TAKE TWO TABLETS BY MOUTH AT BED

TIME TAKE TWO TABLETS BY MOUTH AT 

BEDTIME      SOLD: 2020                                        [facility]

[facility]

 

          0.4 mg              10/04/2020 12:00:00 AM EDT capsule   14           

       TAKE ONE CAPSULE BY MOUTH EVERY

 DAY AS DIRECTED          TAKE ONE CAPSULE BY MOUTH EVERY DAY AS DIRECTED SOLD: 

10/04/2020                                                      [facility]

 

          500 mg              10/04/2020 12:00:00 AM EDT tablet    10           

       TAKE ONE TABLET BY MOUTH TWICE A

 DAY AS DIRECTED          TAKE ONE TABLET BY MOUTH TWICE A DAY AS DIRECTED SOLD:

 

10/04/2020                                                      [facility]

 

          25 mg               10/02/2020 12:00:00 AM EDT tablet    180          

       TAKE TWO TABLETS BY MOUTH EVERY 

8 HOURS    TAKE TWO TABLETS BY MOUTH EVERY 8 HOURS SOLD: 2021             

                     [facility]

 

          10 mg               10/02/2020 12:00:00 AM EDT tablet, sublingual 60  

                PLACE 1 TABLET UNDER 

THE TONGUE TWICE A DAY    PLACE 1 TABLET UNDER THE TONGUE TWICE A DAY SOLD: 

10/04/2020                                                      [facility]

 

          25 mg               10/02/2020 12:00:00 AM EDT tablet    180          

       TAKE TWO TABLETS BY MOUTH EVERY 

8 HOURS    TAKE TWO TABLETS BY MOUTH EVERY 8 HOURS SOLD: 2020             

                     [facility]

 

          25 mg               10/02/2020 12:00:00 AM EDT tablet    180          

       TAKE TWO TABLETS BY MOUTH EVERY 

8 HOURS    TAKE TWO TABLETS BY MOUTH EVERY 8 HOURS SOLD: 2020             

                     [facility]

 

          20 mg               10/02/2020 12:00:00 AM EDT tablet    90           

       TAKE ONE TABLET BY MOUTH THREE 

TIMES A DAY TAKE ONE TABLET BY MOUTH THREE TIMES A DAY SOLD: 2020         

                         

[facility]

 

          300 mg              10/02/2020 12:00:00 AM EDT tablet    30           

       TAKE ONE TABLET BY MOUTH EVERY 

EVENING    TAKE ONE TABLET BY MOUTH EVERY EVENING SOLD: 10/04/2020              

                    [facility]

 

          10 mg               10/02/2020 12:00:00 AM EDT tablet, sublingual 60  

                PLACE 1 TABLET UNDER 

THE TONGUE TWICE A DAY    PLACE 1 TABLET UNDER THE TONGUE TWICE A DAY SOLD: 

2020                                                      [facility]

 

          10 mg               10/02/2020 12:00:00 AM EDT tablet, sublingual 60  

                PLACE 1 TABLET UNDER 

THE TONGUE TWICE A DAY    PLACE 1 TABLET UNDER THE TONGUE TWICE A DAY SOLD: 

2020                                                      [facility]

 

          25 mg               10/02/2020 12:00:00 AM EDT tablet    180          

       TAKE TWO TABLETS BY MOUTH EVERY 

8 HOURS    TAKE TWO TABLETS BY MOUTH EVERY 8 HOURS SOLD: 10/04/2020             

                     [facility]

 

          10 mg               10/02/2020 12:00:00 AM EDT tablet, sublingual 60  

                PLACE 1 TABLET UNDER 

THE TONGUE TWICE A DAY    PLACE 1 TABLET UNDER THE TONGUE TWICE A DAY SOLD: 

2021                                                      [facility]

 

          300 mg              10/02/2020 12:00:00 AM EDT tablet    30           

       TAKE ONE TABLET BY MOUTH EVERY 

EVENING    TAKE ONE TABLET BY MOUTH EVERY EVENING SOLD: 2020              

                    [facility]

 

          20 mg               10/02/2020 12:00:00 AM EDT tablet    90           

       TAKE ONE TABLET BY MOUTH THREE 

TIMES A DAY TAKE ONE TABLET BY MOUTH THREE TIMES A DAY SOLD: 2020         

                         

[facility]

 

          300 mg              10/02/2020 12:00:00 AM EDT tablet    30           

       TAKE ONE TABLET BY MOUTH EVERY 

EVENING    TAKE ONE TABLET BY MOUTH EVERY EVENING SOLD: 2021              

                    [facility]

 

          20 mg               10/02/2020 12:00:00 AM EDT tablet    90           

       TAKE ONE TABLET BY MOUTH THREE 

TIMES A DAY TAKE ONE TABLET BY MOUTH THREE TIMES A DAY SOLD: 2021         

                         

[facility]

 

          20 mg               10/02/2020 12:00:00 AM EDT tablet    90           

       TAKE ONE TABLET BY MOUTH THREE 

TIMES A DAY TAKE ONE TABLET BY MOUTH THREE TIMES A DAY SOLD: 10/04/2020         

                         

[facility]

 

          300 mg              10/02/2020 12:00:00 AM EDT tablet    30           

       TAKE ONE TABLET BY MOUTH EVERY 

EVENING    TAKE ONE TABLET BY MOUTH EVERY EVENING SOLD: 2020              

                    [facility]

 

          100 mg              2020 12:00:00 AM EDT tablet    6            

       TAKE ONE TABLET BY MOUTH THREE 

TIMES A DAY TAKE ONE TABLET BY MOUTH THREE TIMES A DAY SOLD: 2020         

                         

[facility]

 

          300 mg              2020 12:00:00 AM EDT capsule   20           

       TAKE ONE CAPSULE BY MOUTH TWICE

 A DAY     TAKE ONE CAPSULE BY MOUTH TWICE A DAY SOLD: 2020               

                   [facility]

 

                    pantoprazole 40 MG Delayed Release Oral Tablet PANTOPRAZOLE 

SODIUM 2020 

12:00:00 AM EDT tablet,delayed release (DR/EC) 30                              T

RONNY ONE TABLET BY MOUTH 

EVERY DAY  TAKE ONE TABLET BY MOUTH EVERY DAY SOLD: 2021                  

                [facility]

 

          40 mg               2020 12:00:00 AM EDT tablet,delayed release 

(DR/EC) 30                  TAKE ONE 

TABLET BY MOUTH EVERY DAY TAKE ONE TABLET BY MOUTH EVERY DAY SOLD: 2020   

              

                                                    [facility]

 

                    pantoprazole 40 MG Delayed Release Oral Tablet PANTOPRAZOLE 

SODIUM 2020 

12:00:00 AM EDT tablet,delayed release (DR/EC) 30                              T

RONNY ONE TABLET BY MOUTH 

EVERY DAY  TAKE ONE TABLET BY MOUTH EVERY DAY SOLD: 12/10/2020                  

                [facility]

 

                    pantoprazole 40 MG Delayed Release Oral Tablet PANTOPRAZOLE 

SODIUM 2020 

12:00:00 AM EDT tablet,delayed release (DR/EC) 30                              T

RONNY ONE TABLET BY MOUTH 

EVERY DAY  TAKE ONE TABLET BY MOUTH EVERY DAY SOLD: 2020                  

                [facility]

 

                    pantoprazole 40 MG Delayed Release Oral Tablet PANTOPRAZOLE 

SODIUM 2020 

12:00:00 AM EDT tablet,delayed release (DR/EC) 30                              T

RONNY ONE TABLET BY MOUTH 

EVERY DAY  TAKE ONE TABLET BY MOUTH EVERY DAY SOLD: 10/08/2020                  

                [facility]

 

          20 mg               2020 12:00:00 AM EDT tablet    60           

       TAKE ONE TABLET BY MOUTH TWICE A 

DAY        TAKE ONE TABLET BY MOUTH TWICE A DAY SOLD: 2020                

                  [facility]

 

                          Propranolol Hydrochloride 20 MG Oral Tablet Propranolo

l HCl 20 MG Propranolol 

HCl 20 MG 2020 12:00:00 AM EDT        1.0 {tablet}                      ac

tive               Propranolol

 HCl 20 MG                              eCW1 ([facility]

laz)

 

                          Propranolol Hydrochloride 20 MG Oral Tablet Propranolo

l HCl 20 MG Propranolol 

HCl 20 MG 2020 12:00:00 AM EDT        1.0 {tablet}                      ac

tive               Propranolol

 HCl 20 MG                              eCW1 ([facility]

laz)

 

                          Propranolol Hydrochloride 20 MG Oral Tablet Propranolo

l HCl 20 MG Propranolol 

HCl 20 MG 2020 12:00:00 AM EDT        1.0 {tablet}                      ac

tive               Propranolol

 HCl 20 MG                              eCW1 ([facility]

laz)

 

          5 mg                2020 12:00:00 AM EDT tablet    60           

       TAKE ONE TABLET BY MOUTH TWO TIMES

 A DAY AS NEEDED MAXIMUM DAILY DOSE = 2 TABLETS TAKE ONE TABLET BY MOUTH TWO 

TIMES A DAY AS NEEDED MAXIMUM DAILY DOSE = 2 TABLETS SOLD: 2020           

                             

[facility]

 

          100 mg              2020 12:00:00 AM EDT tablet    30           

       TAKE ONE TABLET BY MOUTH EVERY 

DAY        TAKE ONE TABLET BY MOUTH EVERY DAY SOLD: 2020                  

                [facility]

 

          100 mg              2020 12:00:00 AM EDT tablet    30           

       TAKE ONE TABLET BY MOUTH EVERY 

DAY        TAKE ONE TABLET BY MOUTH EVERY DAY SOLD: 2020                  

                [facility]

 

          100 mg              2020 12:00:00 AM EDT tablet    30           

       TAKE ONE TABLET BY MOUTH EVERY 

DAY        TAKE ONE TABLET BY MOUTH EVERY DAY SOLD: 2020                  

                [facility]

 

                quetiapine 100 MG Oral Tablet QUETIAPINE FUMARATE 2020 12:

00:00 AM EDT 

tablet          60                              TAKE TWO TABLETS BY MOUTH AT BED

TIME TAKE TWO TABLETS BY MOUTH AT 

BEDTIME      SOLD: 2020                                        [facility]

[facility]

 

                quetiapine 100 MG Oral Tablet QUETIAPINE FUMARATE 2020 12:

00:00 AM EDT 

tablet          60                              TAKE TWO TABLETS BY MOUTH AT BED

TIME TAKE TWO TABLETS BY MOUTH AT 

BEDTIME      SOLD: 2020                                        [facility]

[facility]

 

                quetiapine 100 MG Oral Tablet QUETIAPINE FUMARATE 2020 12:

00:00 AM EDT 

tablet          60                              TAKE TWO TABLETS BY MOUTH AT BED

TIME TAKE TWO TABLETS BY MOUTH AT 

BEDTIME      SOLD: 09/10/2020                                        [facility]

[facility]

 

                quetiapine 100 MG Oral Tablet QUETIAPINE FUMARATE 2020 12:

00:00 AM EDT 

tablet          60                              TAKE TWO TABLETS BY MOUTH AT BED

TIME TAKE TWO TABLETS BY MOUTH AT 

BEDTIME      SOLD: 2020                                        [facility]

[facility]

 

          10 mg               2020 12:00:00 AM EDT tablet, sublingual 60  

                PLACE 1 TABLET UNDER 

THE TONGUE TWICE A DAY    PLACE 1 TABLET UNDER THE TONGUE TWICE A DAY SOLD: 

2020                                                      [facility]

 

          10 mg               2020 12:00:00 AM EDT tablet, sublingual 60  

                PLACE 1 TABLET UNDER 

THE TONGUE TWICE A DAY    PLACE 1 TABLET UNDER THE TONGUE TWICE A DAY SOLD: 

2020                                                      [facility]

 

          10 mg               2020 12:00:00 AM EDT tablet, sublingual 60  

                PLACE 1 TABLET UNDER 

THE TONGUE TWICE A DAY    PLACE 1 TABLET UNDER THE TONGUE TWICE A DAY SOLD: 

2020                                                      [facility]

 

          10 mg               2020 12:00:00 AM EDT tablet, sublingual 60  

                PLACE 1 TABLET UNDER 

THE TONGUE TWICE A DAY    PLACE 1 TABLET UNDER THE TONGUE TWICE A DAY SOLD: 

2020                                                      [facility]

 

          300 mg              2020 12:00:00 AM EDT tablet    30           

       TAKE ONE TABLET BY MOUTH EVERY 

EVENING    TAKE ONE TABLET BY MOUTH EVERY EVENING SOLD: 2020              

                    [facility]

 

          300 mg              2020 12:00:00 AM EDT tablet    30           

       TAKE ONE TABLET BY MOUTH EVERY 

EVENING    TAKE ONE TABLET BY MOUTH EVERY EVENING SOLD: 2020              

                    [facility]

 

          300 mg              2020 12:00:00 AM EDT tablet    30           

       TAKE ONE TABLET BY MOUTH EVERY 

EVENING    TAKE ONE TABLET BY MOUTH EVERY EVENING SOLD: 2020              

                    [facility]

 

          300 mg              2020 12:00:00 AM EDT tablet    30           

       TAKE ONE TABLET BY MOUTH EVERY 

EVENING    TAKE ONE TABLET BY MOUTH EVERY EVENING SOLD: 2020              

                    [facility]

 

          5 mg                2020 12:00:00 AM EDT tablet    30           

       TAKE 1 TABLET BY MOUTH TWICE AS 

NEEDED MAXIMUM DAILY DOSE = 2 TABLETS   TAKE 1 TABLET BY MOUTH TWICE AS NEEDED 

MAXIMUM DAILY DOSE = 2 TABLETS SOLD: 2020                                 

       [facility]

 

          0.25 mg             2020 12:00:00 AM EDT tablet    30           

       TAKE 1 TABLET EVERY DAY AT 

BEDTIME MAXIMUM DAILY DOSE = 1 TABLET   TAKE 1 TABLET EVERY DAY AT BEDTIME MAXIM

UM

 DAILY DOSE = 1 TABLET SOLD: 2020                                        [facility]

[facility]

 

          40 mg               2020 12:00:00 AM EDT tablet,delayed release 

(DR/EC) 30                  TAKE ONE 

TABLET BY MOUTH EVERY DAY TAKE ONE TABLET BY MOUTH EVERY DAY SOLD: 2020   

              

                                                    [facility]

 

          40 mg               2020 12:00:00 AM EDT tablet,delayed release 

(DR/EC) 30                  TAKE ONE 

TABLET BY MOUTH EVERY DAY TAKE ONE TABLET BY MOUTH EVERY DAY SOLD: 2020   

              

                                                    [facility]

 

          40 mg               2020 12:00:00 AM EDT tablet,delayed release 

(DR/EC) 30                  TAKE ONE 

TABLET BY MOUTH EVERY DAY TAKE ONE TABLET BY MOUTH EVERY DAY SOLD: 2020   

              

                                                    [facility]

 

          40 mg               2020 12:00:00 AM EDT tablet,delayed release 

(DR/EC) 30                  TAKE ONE 

TABLET BY MOUTH EVERY DAY TAKE ONE TABLET BY MOUTH EVERY DAY SOLD: 2020   

              

                                                    [facility]

 

          40 mg               2020 12:00:00 AM EDT tablet,delayed release 

(DR/EC) 30                  TAKE ONE 

TABLET BY MOUTH EVERY DAY TAKE ONE TABLET BY MOUTH EVERY DAY SOLD: 2020   

              

                                                    [facility]

 

          100 mg              2020 12:00:00 AM EDT tablet    60           

       TAKE TWO TABLETS BY MOUTH ONCE 

DAILY      TAKE TWO TABLETS BY MOUTH ONCE DAILY SOLD: 2020                

                  [facility]

 

          100 mg              2020 12:00:00 AM EDT tablet    60           

       TAKE TWO TABLETS BY MOUTH ONCE 

DAILY      TAKE TWO TABLETS BY MOUTH ONCE DAILY SOLD: 2020                

                  [facility]

 

          100 mg              2020 12:00:00 AM EDT tablet    60           

       TAKE TWO TABLETS BY MOUTH ONCE 

DAILY      TAKE TWO TABLETS BY MOUTH ONCE DAILY SOLD: 2020                

                  [facility]

 

          100 mg              2020 12:00:00 AM EDT tablet    60           

       TAKE TWO TABLETS BY MOUTH ONCE 

DAILY      TAKE TWO TABLETS BY MOUTH ONCE DAILY SOLD: 2020                

                  [facility]

 

          0.25 mg             2020 12:00:00 AM EDT tablet    30           

       TAKE 1 TABLET BY MOUTH DAILY AT

 BEDTIME MAXIMUM DAILY DOSE = 1 TABLET  TAKE 1 TABLET BY MOUTH DAILY AT BEDTIME 

MAXIMUM DAILY DOSE = 1 TABLET SOLD: 2020                                  

      [facility]

 

          90 mcg/actuation           2020 12:00:00 AM EDT HFA aerosol inha

ler 18                  INHALE 

TWO PUFFS BY MOUTH EVERY 4 HOURS AS NEEDED INHALE TWO PUFFS BY MOUTH EVERY 4 

HOURS AS NEEDED SOLD: 2020                                        [facility]

 

          90 mcg/actuation           2020 12:00:00 AM EDT HFA aerosol inha

ler 18                  INHALE 

TWO PUFFS BY MOUTH EVERY 4 HOURS AS NEEDED INHALE TWO PUFFS BY MOUTH EVERY 4 

HOURS AS NEEDED SOLD: 2020                                        [facility]

 

          90 mcg/actuation           2020 12:00:00 AM EDT HFA aerosol inha

ler 18                  INHALE 

TWO PUFFS BY MOUTH EVERY 4 HOURS AS NEEDED INHALE TWO PUFFS BY MOUTH EVERY 4 

HOURS AS NEEDED SOLD: 2020                                        [facility]

 

          0.25 mg             2020 12:00:00 AM EDT tablet    15           

       TAKE ONE TABLET BY MOUTH DAILY 

AT BEDTIME FOR 15 DAYS MAXIMUM DAILY DOSE = 1 TABLET TAKE ONE TABLET BY MOUTH 

DAILY AT BEDTIME FOR 15 DAYS MAXIMUM DAILY DOSE = 1 TABLET SOLD: 2020     

                            

                                        [facility]

 

                    Triazolam 0.25 MG Oral Tablet [Halcion] Halcion 0.25 MG Halc

ion 0.25 MG     

03/10/2020 12:00:00 AM EDT        1.0 {tablet}                      active      

         Halcion 0.25 MG eCW1 

([facility])

 

                    Triazolam 0.25 MG Oral Tablet [Halcion] Halcion 0.25 MG Halc

ion 0.25 MG     

03/10/2020 12:00:00 AM EDT        1.0 {tablet}                      active      

         Halcion 0.25 MG eCW1 

([facility])

 

                    Triazolam 0.25 MG Oral Tablet [Halcion] Halcion 0.25 MG Halc

ion 0.25 MG     

03/10/2020 12:00:00 AM EDT        1.0 {tablet}                      active      

         Halcion 0.25 MG eCW1 

([facility])

 

          40 mg               2020 12:00:00 AM EST tablet,delayed release 

(DR/EC) 30                  TAKE 1 

TABLET BY MOUTH ONCE A DAY TAKE 1 TABLET BY MOUTH ONCE A DAY SOLD: 2020   

              

                                                    [facility]

 

          40 mg               2020 12:00:00 AM EST tablet,delayed release 

(DR/EC) 30                  TAKE 1 

TABLET BY MOUTH ONCE A DAY TAKE 1 TABLET BY MOUTH ONCE A DAY SOLD: 2020   

              

                                                    [facility]

 

          10 mg               2020 12:00:00 AM EST tablet, sublingual 60  

                PLACE ONE TABLET 

UNDER THE TONGUE AND ALLOW TO DISSOLVE TWICE A DAY PLACE ONE TABLET UNDER THE 

TONGUE AND ALLOW TO DISSOLVE TWICE A DAY SOLD: 2020                       

                 [facility]

 

          25 mg               2020 12:00:00 AM EST tablet    180          

       TAKE TWO TABLETS BY MOUTH EVERY 

8 HOURS    TAKE TWO TABLETS BY MOUTH EVERY 8 HOURS SOLD: 2020             

                     [facility]

 

                quetiapine 100 MG Oral Tablet QUETIAPINE FUMARATE 2020 12:

00:00 AM EST 

tablet          60                              TAKE TWO TABLETS BY MOUTH DAILY 

AT BEDTIME TAKE TWO TABLETS BY MOUTH

 DAILY AT BEDTIME SOLD: 2020                                        [facility]

 

                quetiapine 100 MG Oral Tablet QUETIAPINE FUMARATE 2020 12:

00:00 AM EST 

tablet          60                              TAKE TWO TABLETS BY MOUTH DAILY 

AT BEDTIME TAKE TWO TABLETS BY MOUTH

 DAILY AT BEDTIME SOLD: 2020                                        [facility]

 

                quetiapine 100 MG Oral Tablet QUETIAPINE FUMARATE 2020 12:

00:00 AM EST 

tablet          60                              TAKE TWO TABLETS BY MOUTH DAILY 

AT BEDTIME TAKE TWO TABLETS BY MOUTH

 DAILY AT BEDTIME SOLD: 2020                                        [facility]

 

          100 mg              2020 12:00:00 AM EST tablet    60           

       TAKE TWO TABLETS BY MOUTH ONCE 

DAILY      TAKE TWO TABLETS BY MOUTH ONCE DAILY SOLD: 2020                

                  [facility]

 

          10 mg               2020 12:00:00 AM EST tablet, sublingual 60  

                PLACE ONE TABLET 

UNDER THE TONGUE AND ALLOW TO DISSOLVE TWICE A DAY PLACE ONE TABLET UNDER THE 

TONGUE AND ALLOW TO DISSOLVE TWICE A DAY SOLD: 2020                       

                 [facility]

 

          10 mg               2020 12:00:00 AM EST tablet, sublingual 60  

                PLACE ONE TABLET 

UNDER THE TONGUE AND ALLOW TO DISSOLVE TWICE A DAY PLACE ONE TABLET UNDER THE 

TONGUE AND ALLOW TO DISSOLVE TWICE A DAY SOLD: 2020                       

                 [facility]

 

          25 mg               2020 12:00:00 AM EST tablet    180          

       TAKE TWO TABLETS BY MOUTH EVERY 

8 HOURS    TAKE TWO TABLETS BY MOUTH EVERY 8 HOURS SOLD: 2020             

                     [facility]

 

          100 mg              2020 12:00:00 AM EST tablet    60           

       TAKE TWO TABLETS BY MOUTH ONCE 

DAILY      TAKE TWO TABLETS BY MOUTH ONCE DAILY SOLD: 2020                

                  [facility]

 

                quetiapine 100 MG Oral Tablet QUETIAPINE FUMARATE 2020 12:

00:00 AM EST 

tablet          60                              TAKE TWO TABLETS BY MOUTH DAILY 

AT BEDTIME TAKE TWO TABLETS BY MOUTH

 DAILY AT BEDTIME SOLD: 2020                                        [facility]

 

                          Hydroxyzine Hydrochloride 25 MG Oral Tablet HydrOXYzin

e HCl 25 MG HydrOXYzine 

HCl 25 MG 2020 12:00:00 AM EST                               active       

      HydrOXYzine HCl 25 MG 

eCW1 ([facility])

 

                          Hydroxyzine Hydrochloride 25 MG Oral Tablet HydrOXYzin

e HCl 25 MG HydrOXYzine 

HCl 25 MG 2020 12:00:00 AM EST                               active       

      HydrOXYzine HCl 25 MG 

eCW1 ([facility])

 

                          Hydroxyzine Hydrochloride 25 MG Oral Tablet HydrOXYzin

e HCl 25 MG HydrOXYzine 

HCl 25 MG 2020 12:00:00 AM EST                               active       

      HydrOXYzine HCl 25 MG 

eCW1 ([facility])

 

          300 mg              2020 12:00:00 AM EST tablet    30           

       TAKE ONE TABLET BY MOUTH EVERY 

DAY IN THE EVENING        TAKE ONE TABLET BY MOUTH EVERY DAY IN THE EVENING SOLD

: 

2020                                                      [facility]

 

          300 mg              2020 12:00:00 AM EST tablet    30           

       TAKE ONE TABLET BY MOUTH EVERY 

DAY IN THE EVENING        TAKE ONE TABLET BY MOUTH EVERY DAY IN THE EVENING SOLD

: 

2020                                                      [facility]

 

          300 mg              2020 12:00:00 AM EST tablet    30           

       TAKE ONE TABLET BY MOUTH EVERY 

DAY IN THE EVENING        TAKE ONE TABLET BY MOUTH EVERY DAY IN THE EVENING SOLD

: 

2020                                                      [facility]

 

          300 mg              2020 12:00:00 AM EST tablet    30           

       TAKE ONE TABLET BY MOUTH EVERY 

DAY IN THE EVENING        TAKE ONE TABLET BY MOUTH EVERY DAY IN THE EVENING SOLD

: 

2020                                                      [facility]

 

          40 mg               2019 12:00:00 AM EST tablet,delayed release 

(DR/EC) 30                  TAKE 1 

TABLET BY MOUTH ONCE A DAY TAKE 1 TABLET BY MOUTH ONCE A DAY SOLD: 2019   

              

                                                    [facility]

 

          40 mg               2019 12:00:00 AM EST tablet,delayed release 

(DR/EC) 30                  TAKE 1 

TABLET BY MOUTH ONCE A DAY TAKE 1 TABLET BY MOUTH ONCE A DAY SOLD: 2020   

              

                                                    [facility]

 

          90 mcg/actuation           2019 12:00:00 AM EST HFA aerosol inha

ler 18                  INHALE 

TWO PUFFS BY MOUTH EVERY 4 HOURS AS NEEDED INHALE TWO PUFFS BY MOUTH EVERY 4 

HOURS AS NEEDED SOLD: 2020                                        [facility]

 

          90 mcg/actuation           2019 12:00:00 AM EST HFA aerosol inha

ler 18                  INHALE 

TWO PUFFS BY MOUTH EVERY 4 HOURS AS NEEDED INHALE TWO PUFFS BY MOUTH EVERY 4 

HOURS AS NEEDED SOLD: 2020                                        [facility]

 

          90 mcg/actuation           2019 12:00:00 AM EST HFA aerosol inha

ler 18                  INHALE 

TWO PUFFS BY MOUTH EVERY 4 HOURS AS NEEDED INHALE TWO PUFFS BY MOUTH EVERY 4 

HOURS AS NEEDED SOLD: 2020                                        [facility]

 

          90 mcg/actuation           2019 12:00:00 AM EST HFA aerosol inha

ler 18                  INHALE 

TWO PUFFS BY MOUTH EVERY 4 HOURS AS NEEDED INHALE TWO PUFFS BY MOUTH EVERY 4 

HOURS AS NEEDED SOLD: 2019                                        [facility]

 

          90 mcg/actuation           2019 12:00:00 AM EST HFA aerosol inha

ler 18                  INHALE 

TWO PUFFS BY MOUTH EVERY 4 HOURS AS NEEDED INHALE TWO PUFFS BY MOUTH EVERY 4 

HOURS AS NEEDED SOLD: 2020                                        [facility]

 

          90 mcg/actuation           2019 12:00:00 AM EST HFA aerosol inha

ler 18                  INHALE 

TWO PUFFS BY MOUTH EVERY 4 HOURS AS NEEDED INHALE TWO PUFFS BY MOUTH EVERY 4 

HOURS AS NEEDED SOLD: 2020                                        [facility]

 

          100 mg              11/10/2019 12:00:00 AM EST tablet    30           

       TAKE ONE TABLET BY MOUTH EVERY 

DAY        TAKE ONE TABLET BY MOUTH EVERY DAY SOLD: 2020                  

                [facility]

 

                quetiapine 100 MG Oral Tablet QUETIAPINE FUMARATE 11/10/2019 12:

00:00 AM EST 

tablet          60                              TAKE TWO TABLETS BY MOUTH AT BED

TIME TAKE TWO TABLETS BY MOUTH AT 

BEDTIME      SOLD: 2019                                        [facility]

[facility]

 

                quetiapine 100 MG Oral Tablet QUETIAPINE FUMARATE 11/10/2019 12:

00:00 AM EST 

tablet          60                              TAKE TWO TABLETS BY MOUTH AT BED

TIME TAKE TWO TABLETS BY MOUTH AT 

BEDTIME      SOLD: 2020                                        [facility]

[facility]

 

          100 mg              11/10/2019 12:00:00 AM EST tablet    30           

       TAKE ONE TABLET BY MOUTH EVERY 

DAY        TAKE ONE TABLET BY MOUTH EVERY DAY SOLD: 2019                  

                [facility]

 

          10 mg               10/16/2019 12:00:00 AM EDT tablet, sublingual 60  

                PLACE ONE TABLET 

UNDER THE TONGUE AND DISSOLVE TWICE A DAY PLACE ONE TABLET UNDER THE TONGUE AND 

DISSOLVE TWICE A DAY SOLD: 2019                                        [facility]

 

          10 mg               10/16/2019 12:00:00 AM EDT tablet, sublingual 60  

                PLACE ONE TABLET 

UNDER THE TONGUE AND DISSOLVE TWICE A DAY PLACE ONE TABLET UNDER THE TONGUE AND 

DISSOLVE TWICE A DAY SOLD: 2020                                        [facility]

 

          300 mg              2019 12:00:00 AM EDT tablet    30           

       TAKE ONE TABLET BY MOUTH DAILY 

EVERY EVENING TAKE ONE TABLET BY MOUTH DAILY EVERY EVENING SOLD: 2019     

                      

                                        [facility]



                                                                                
                                                                                
                                                                                
                                                                                
                                                                                
                                                                                
                                                                                
                                                                                
                                                                                
                                                                                
                                                                                
                                                                                
                                                                                
                                                                                
                                                                                
                                      



Insurance Providers

          



             Payer name   Policy type / Coverage type Policy ID    Covered party

 ID Covered 

party's relationship to borrego Policy Bor[employer]             Plan Information

 

          [employer] [facility]           [id number]           SP           

       [id number]

 

          [facility]            [id number]           S                   [id number]

 

          [facility] MEDICA[facility]           [id number]           S            

       [id number]

 

          [facility] MEDICAID           [id number]           S            

       [id number]

 

          [facility] MEDICAID           [id number]           S            

       [id number]

 

          [facility] [address]           [id number]           S     

              [id number]

 

          MEDICAID            [id number]            S                   [id number]

 

          Medicaid  P         [id number]            S                   [id number]

 

          [facility]            [id number]           S                   [id number]

 

          [facility] MEDICAID           [id number]           S            

       [id number]

 

                    [facility] [id number]          

                     

[id number]

 

                    [facility]-Med[facility] [id number]          

                     

[id number]

 

                    [employer] [id number]        

                       

[id number]

 

                    [facility]-Medicaid [id number]          

                     

[id number]

 

          [employer] FB            [id number]           S                   [id number]

 

                    [employer] [id number]          

                     

[id number]

 

                    [employer] [id number]        

                       

[id number]

 

          [employer]           [id number]           S            

       [id number]

 

          [employer] FB            [id number]           S                   [id number]

 

                    [employer] [id number]        

                       

[id number]

 

                    [employer] [id number]          

                     

[id number]

 

          [employer] HMO   [id number]           S            

       [id number]

 

          [employer] [facility]           [id number]           SP           

       [id number]

 

          [facility]   [id number]           S                   [id number]

 

          [facility]) O         [id number]           C              

     [id number]

 

          [employer] MED[employer] HMO   [id number]           S            

       [id number]

 

          [employer]OSS CASTR[employer] PLAN           [id number]           SP            

      [id number]

 

          [employer] [place]        [id number]           S            

       [id number]

 

          [employer] HMO BLUE BC        [id number]           S                   [id number]

[id number]

 

          [employer] PLAN           [id number]            SP               

   [id number]

 

          [facility] P         UNAVAILABLE           C             

      UNAVAILABLE

 

          SELF PAY            UNAVAILABLE                               UNAVAILA

BLE

 

                                                                       



                                                                                
                                                                                
                                                                                
                                                                                
                                                                                
        



Problems, Conditions, and Diagnoses

          



           Code       Display Name Description Problem Type Effective Dates Data

 Source(s)

 

                    N40.0               [id number]           Benign prostatic hyp

erplasia, unspecified whether lower urinary 

tract symptoms present Problem             10/08/2020 12:00:00 AM EDT eCW1 ([facility])

 

                    E66.09              [id number]           Class 2 obesity due 

to excess calories without serious 

comorbidity in adult, unspecified BMI Problem             10/08/2020 12:00:00 AM

 EDT eCW1 

([facility])

 

             M54.31       [id number] Sciatica, right side Problem      10

/2020 12:00:00 AM EDT

                                        eCW1 ([facility]

laz)

 

           M54.32     [id number]   Sciatica, left side Problem    10/08/2020 12:00

:00 AM EDT eCW1 

([facility])

 

                F43.23          [id number]       Adjustment disorder with mixed a

nxiety and depressed mood 

Problem                   2020 12:00:00 AM EDT eCW1 ([facility])

 

                    F43.23              Adjustment disorder with mixed anxiety a

nd depressed mood ADJUSTMENT 

DISORDER WITH MIXED ANXIETY AND DEPRESS Diagnosis           2020 01:20:00 

PM [facility]

 

                    F43.10              Post-traumatic stress disorder, unspecif

ied POST-TRAUMATIC STRESS 

DISORDER, UNSPECIFIED Diagnosis           2020 01:20:00 PM [facility]

sarthak

 

                    F41.0               Panic disorder [episodic paroxysmal anxi

ety] PANIC DISORDER [EPISODIC 

PAROXYSMAL ANXIETY] Diagnosis           2020 01:20:00 PM [facility]

l

 

             F60.3        Borderline personality disorder BORDERLINE PERSONALITY

 DISORDER Diagnosis    

2020 09:22:00 AM [facility]

 

                F41.8           Other specified anxiety disorders OTHER SPECIFIE

D ANXIETY DISORDERS 

Diagnosis                 2020 11:40:00 AM [facility]

 

                    Z90.49              Acquired absence of other specified part

s of digestive tract ACQUIRED 

ABSENCE OF OTHER SPECIFIED PARTS OF DIGES Diagnosis           10/27/2020 07:34:0

0 AM [facility]

 

                    Z79.899             Other long term (current) drug therapy O

THER LONG TERM (CURRENT) DRUG 

THERAPY             Diagnosis           10/27/2020 07:34:00 AM [facility]

l

 

             R35.0        Frequency of micturition FREQUENCY OF MICTURITION Diag

nosis    10/27/2020 

07:34:00 AM [facility]

 

             M54.32       Sciatica, left side SCIATICA, LEFT SIDE Diagnosis    1

 02:37:00 PM 

[facility]

 

                    M51.34              Other intervertebral disc degeneration, 

thoracic region OTHER 

INTERVERTEBRAL DISC DEGENERATION, THORACIC R Diagnosis                 10/12/202

0 02:06:00 PM 

[facility]

 

             M54.31       Sciatica, right side SCIATICA, RIGHT SIDE Diagnosis   

 10/12/2020 02:06:00 

PM [facility]

 

             M25.559      Pain in unspecified hip PAIN IN UNSPECIFIED HIP Diagno

sis    10/12/2020 

02:06:00 PM [facility]

 

                    E66.09              Other obesity due to excess calories OTH

ER OBESITY DUE TO EXCESS CALORIES

                    Diagnosis           10/08/2020 12:43:00 PM [facility]

 

                    N40.0               Benign prostatic hyperplasia without low

er urinary tract symptoms BENIGN 

PROSTATIC HYPERPLASIA WITHOUT LOWER URINRY Diagnosis           10/08/2020 12:43:

00 PM [facility]

 

           M25.552    Pain in left hip PAIN IN LEFT HIP Diagnosis  10/08/2020 12

:43:00 PM [facility]

 

             M25.551      Pain in right hip PAIN IN RIGHT HIP Diagnosis    10/08

/2020 12:43:00 PM [facility]

 

                    Z13.220             Encounter for screening for lipoid disor

ders ENCOUNTER FOR SCREENING FOR

LIPOID DISOR        Diagnosis           10/08/2020 12:43:00 PM [facility]

 

                N30.00          Acute cystitis without hematuria ACUTE CYSTITIS 

WITHOUT HEMATURIA 

Diagnosis                 10/08/2020 12:43:00 PM [facility]

 

                    Z87.440             Personal history of urinary (tract) infe

ctions PERSONAL HISTORY OF 

URINARY (TRACT) INFECTIONS Diagnosis           10/04/2020 05:42:00 AM [facility]

 

                    N40.1               Benign prostatic hyperplasia with lower 

urinary tract symptoms BENIGN 

PROSTATIC HYPERPLASIA WITH LOWER URINARY TR Diagnosis                 10/04/2020

 05:42:00 AM [facility]

 

             F41.9        Anxiety disorder, unspecified ANXIETY DISORDER, UNSPEC

IFIED Diagnosis    

2020 11:40:00 AM [facility]

 

           R30.0      Dysuria    DYSURIA    Diagnosis  2020 08:30:00 PM [facility]

 

                          Z53.21                    Procedure and treatment not 

carried out due to patient leaving prior to 

being seen by health care provider      PROC/TRTMT NOT CRD OUT D/T PT LV BEF SEE

N BY 

[facility] CARE PROV      Diagnosis           2020 11:22:00 AM [facility]

 

             R63.4        Abnormal weight loss ABNORMAL WEIGHT LOSS Diagnosis   

 2020 02:18:00 PM

[facility]



                                                                                
                                                                                
                                                                                
                                                                                
                                            



Results

          



                    ID                  Date                Data Source

 

                    [id number]       10/27/2020 09:18:00 AM [facility]

 

                                          Patient: ANITHA SILVER Observation

 Report - Physicians/Mid Levels MRN: 

[id number] [facility].VisitID: [id number] [place] [address] [phone], MRegistration Date/Time: 10/27/2020 06:49 Weight:113.3 kg
(S). Height/Length:72 inches (S). BMI:33.9        FAMILY HISTORYNo significant 
family medical history.        (Electronically signed by RICCARDO Hughes 
10/27/2020 08:50)   









          Name      Value     Range     Interpretation Code Description Data Janice

rce(s) Supporting 

Document(s)

 

                                                                       









                    ID                  Date                Data Source

 

                    [id number] 10/27/2020 08:41:00 AM EDT [facility]









          Name      Value     Range     Interpretation Code Description Data Janice

rce(s) Supporting 

Document(s)

 

          PSA SCREENING 3.3 ng/mL 0.0-4.0                       [facility]  

 

                                        THIS ASSAY WAS PERFORMED ON THE SIEMENS DIMENSION EXL USINGTHE B-

GALACTOSIDASE/CRPG METHODOLOGY. THE PSA IS NOT AN ABSOLUTE TEST FOR MALIGNANCY. 
IT SHOULD BEUSED IN CONJUNCTION WITH INFORMATION AVAILABLE FROM THECLINICAL 
EVALUATION AND OTHER DIAGNOSTIC PROCEDURES. VALUES OBTAINED WITH DIFFERENT ASSAY
METHODS CANNOT BE USEDINTERCHANGEABLY. 









                    ID                  Date                Data Source

 

                    [id number]    10/27/2020 08:25:00 AM [facility]









          Name      Value     Range     Interpretation Code Description Data [facility]

rce(s) Supporting 

Document(s)

 

          GLUCOSE   109 mg/dL     H                   [facility]  

 

          BLOOD UREA NITROGEN 15 mg/dL  7-18                          [facility]

ital  

 

          CREATININE 1.3 mg/dL 0.7-1.3                       [facility]  

 

          SODIUM    138 mmol/L 136-145                       [facility]  

 

          POTASSIUM 4.4 mmol/L 3.5-5.1                       [facility]  

 

          CHLORIDE  102 mmol/L                         [facility]  

 

          CO2       31 mmol/L 21-32                         [facility]  

 

          CALCIUM   9.1 mg/dL 8.5-10.1                      [facility]  

 

          ANION GAP 5.0 mmol/L 5-12                          [facility]  

 

          GLOMERULAR FILTRATION RATE 59 mL/min                               [facility]  

 

                                        GFR IS CALCULATED IN mL/min/1.73m2 YONATHAN

L FUNCTION:                          

>90MILDLY DECREASED:                        60-89MILDY TO MODERATELY DECREASED: 
         45-59        MODERATELY TO SEVERELY DECREASED:        30-44SEVERELY 
DECREASED:                      15-29RENAL FAILURE:                            
<15 









                    ID                  Date                Data Source

 

                    [id number]   10/27/2020 08:08:00 AM EDT [facility]

l









          Name      Value     Range     Interpretation Code Description Data Jancie

rce(s) Supporting 

Document(s)

 

          WHITE BLOOD COUNT 7.5 K/mm3 4.0-10.0                      [facility]

al  

 

          RED BLOOD COUNT 5.52 M/mm3 4.50-6.00                     [facility]  

 

          HEMOGLOBIN 16.2 gm/dL 14.0-18.0                     [facility]  

 

          HEMATOCRIT 48.5 %    42.0-54.0                     [facility]  

 

          MEAN CELL VOLUME 87.9 fl   80-96                         [facility]  

 

          MEAN CORPUSCULAR HEMOGLOBIN 29.3 pg   27.0-31.0                     [facility]  

 

          MEAN CORPUSCULAR HGB CONC 33.4 g/dl 32.0-36.0                     [facility]  

 

          RED CELL DISTRIBUTION WIDTH 13.1 %    10.0-14.5                     [facility]  

 

          PLATELET COUNT 200 K/mm3 172-450                       [facility] 

 

 

          MEAN PLATELET VOLUME 11.1 fl   9.0-13.0                      [facility]

pital  

 

          GRAN %    55.2 %    50-80.0                       [facility]  

 

          IG%       0.1 %     0.0-0.2                       [facility]  

 

          LYMPH %   33.1 %    25.0-50.0                     [facility]  

 

          MONO %    8.2 %     2.0-10.0                      [facility]  

 

          EOS %     2.9 %     0-5.0                         [facility]  

 

          BASO %    0.5 %     0.0-2.0                       [facility]  

 

          GRAN #    4.1 K/mm3 2.0-8.00                      [facility]  

 

          IG#       0.0 K/mm3 0.0-0.2                       [facility]  

 

          LYMPH #   2.5 K/mm3 1.0-5.0                       [facility]  

 

          MONO #    0.6 K/mm3 0.10-1.20                     [facility]  

 

          EOS #     0.2 K/mm3 0.0-0.5                       [facility]  

 

          BASO #    0.0 K/mm3 0.0-0.2                       [facility]  









                    ID                  Date                Data Source

 

                    [id number]   10/27/2020 07:29:00 AM EDT [facility]

l

 

                                        TSYSORDER [id number] 









          Name      Value     Range     Interpretation Code Description Data Janice

rce(s) Supporting 

Document(s)

 

          URINE RBC 3-5 /hpf  0-3       H                   [facility]  

 

          URINE WBC 1-3 /hpf  0-5                           [facility]  

 

          URINE EPITHELIAL CELLS NONE SEEN /hpf 0                             [facility]  

 

          URINE BACTERIA TRACE     NONE SEEN H                   [facility] 

 

 

          URINE MUCUS 2+        NEGATIVE  [facility]  









                    ID                  Date                Data Source

 

                    [id number] 10/27/2020 07:23:00 AM EDT [facility]

ital

 

                                        TSYSORDER [id number] 









          Name      Value     Range     Interpretation Code Description Data Janice

rce(s) Supporting 

Document(s)

 

          URINE COLOR. [facility]  

 

          URINE APPEARANCE CLEAR                                   [facility]

l  

 

          URINE GLUCOSE (UA) NEGATIVE mg/dL NEGATIVE                      [facility]  

 

          URINE BILIRUBIN NEGATIVE  NEGATIVE                      [facility]

  

 

          URINE KETONE NEGATIVE mg/dL NEGATIVE                      [facility]

al  

 

          SPECIFIC GRAVITY,URINE >1.030    1.001-1.035                     [facility]  

 

          URINE BLOOD TRACE     NEGATIVE  [facility]  

 

                                        10/27/20 0723:  BLOOD previously reporte

d as: TRACE   

 

          PH,URINE  6.0       5.0-9.0                       [facility]  

 

          URINE PROTEIN NEGATIVE mg/dL NEGATIVE                      [facility]

[facility]  

 

          URINE UROBILINOGEN NORMAL(0.2-1) mg/dL 0-1                           R

[facility]  

 

          URINE NITRATE NEGATIVE  NEGATIVE                      [facility]  

 

          URINE LEUKOCYTE ESTERASE NEGATIVE  NEGATIVE                      [facility]  









                    ID                  Date                Data Source

 

                    [id number]       10/12/2020 03:12:00 PM EDT [facility]

l

 

                                         Left Hip DATE OF EXAMINATION: 10/12/202

0 14:11 EDT HIP UNILATERAL COMPLETE 

INDICATION:   Pain COMPARISON:  None  TECHNIQUE:   2 views of the hip were 
obtained.  FINDINGS:  No fracture or dislocation.  The bone density appears 
normal with noevidence of lytic or blastic lesions.  The joint space appears 
normal.  IMPRESSION:  Negative exam.  Electronically signed in  by: Semaj Augustin M.D. 10/12/2020 15:06 EDT  









          Name      Value     Range     Interpretation Code Description Data Janice

rce(s) Supporting 

Document(s)

 

                                                                       









                    ID                  Date                Data Source

 

                    [id number]       10/12/2020 03:11:00 PM EDT [facility]

l

 

                                         Right Hip DATE OF EXAMINATION: 10/12/20

20 14:11 EDT HIP UNILATERAL COMPLETE 

INDICATION:   Pain COMPARISON:  None  TECHNIQUE:    2 views of the hip were 
obtained.  FINDINGS:  No fracture or dislocation.  The bone density appears 
normal with noevidence of lytic or blastic lesions.  The joint space appears 
normal.  IMPRESSION:  Negative exam.  Electronically signed in  by: Semaj Augustin M.D. 10/12/2020 15:05 EDT  









          Name      Value     Range     Interpretation Code Description Data Janice

rce(s) Supporting 

Document(s)

 

                                                                       









                    ID                  Date                Data Source

 

                    [id number]       10/12/2020 03:11:00 PM EDT [facility]

l

 

                                        LUMBAR SPINE DATE OF EXAMINATION: 10/12/

2020 14:11 EDT SPINE LUMBAR COMP 

INDICATION:  Pain  COMPARISON:  None  TECHNIQUE:  AP, lateral, bilateral oblique
views of the lumbar spine wereobtained . FINDINGS: There is no fracture or 
subluxation. There is degenerative disc spacenarrowing at T11-T12, T12-L1, L1-
L2. Small osteophytes at several levels.Paraspinal soft tissues are 
unremarkable. No evidence of pars defect orspondylolisthesis.   IMPRESSION:  
Degenerative changes lower thoracic and upper lumbar spine Electronically signed
in  by: Semaj Augustin M.D. 10/12/2020 15:05 EDT  









          Name      Value     Range     Interpretation Code Description Data [facility]

rce(s) Supporting 

Document(s)

 

                                                                       









                    ID                  Date                Data Source

 

                    [id number]    10/08/2020 02:25:00 PM EDT [facility]









          Name      Value     Range     Interpretation Code Description [facility] 

Document(s)

 

          CHOLESTEROL 204 mg/dL 0-200     H                   [facility]  

 

          TRIGLYCERIDES 172 mg/dL 0-150     H                   [facility]  

 

          LDL CHOLESTEROL 134 mg/dL 0-100     H                   [facility]

  

 

          HDL CHOLESTEROL 36 mg/dL  40-60     L                   [facility]

  

 

          CHOL/HDL RATIO 5.7       0.0-5.0   H                   [facility] 

 









                    ID                  Date                Data Source

 

                    [id number]    10/08/2020 02:25:00 PM T [facility]









          Name      Value     Range     Interpretation Code Description Data [facility] 

Document(s)

 

          GLUCOSE   102 mg/dL                         [facility]  

 

          BLOOD UREA NITROGEN 16 mg/dL  7-18                          [facility]

ital  

 

          CREATININE 1.4 mg/dL 0.7-1.3   H                   [facility]  

 

          SODIUM    140 mmol/L 136-145                       [facility]  

 

          POTASSIUM 4.7 mmol/L 3.5-5.1                       [facility]  

 

          CHLORIDE  103 mmol/L                         [facility]  

 

          CO2       30 mmol/L 21-32                         [facility]  

 

          CALCIUM   9.5 mg/dL 8.5-10.1                      [facility]  

 

          ANION GAP 7.0 mmol/L 5-12                          [facility]  

 

          GLOMERULAR FILTRATION RATE 54 mL/min                               [facility]

[facility]  

 

                                        GFR IS CALCULATED IN mL/min/1.73m2 YONATHAN

L FUNCTION:                          

>90MILDLY DECREASED:                        60-89MILDY TO MODERATELY DECREASED: 
         45-59        MODERATELY TO SEVERELY DECREASED:        30-44SEVERELY 
DECREASED:                      15-29RENAL FAILURE:                            
<15 

 

          AST       19 U/L    15-37                         [facility]  

 

          ALT       43 U/L    12-78                         [facility]  

 

          ALKALINE PHOSPHATASE 80 U/L                            [facility]  

 

          TOTAL BILIRUBIN 1.0 mg/dL 0.2-1.0                       [facility]

  

 

          TOTAL PROTEIN 8.0 g/dl  6.4-8.2                       [facility]  

 

          ALBUMIN   4.3 gm/dL 3.4-5.0                       [facility]  









                    ID                  Date                Data Source

 

                    LIPID PROFILE       10/08/2020 04:21:20 AM EDT eCW1 ([facility])









          Name      Value     Range     Interpretation Code Description Data Janice

rce(s) Supporting 

Document(s)

 

                    121       0-100               LDL CHOLESTEROL eCW1 ([facility])  

 

                    204                           CHOLESTEROL eCW1 ([facility])  

 

             Cholesterol in LDL [Mass/volume] in Serum or Plasma by calculation 

134                                    LDL 

CHOLESTEROL               eCW1 ([facility])  

 

                    36                            HDL CHOLESTEROL eCW1 ([facility])  

 

                    172                           TRIGLYCERIDES eCW1 ([facility][note 2[facility]  

 

                    5.7                           CHOL/HDL RATIO eCW1 ([facility])  









                    ID                  Date                Data Source

 

                    [id number]       10/04/2020 07:15:00 AM EDT [facility]

 

                                          Patient: ANITHA SILVER Observation

 Report - Physicians/Mid Levels MRN: 

[id number] [facility].VisitID: [id number] [place] [address] [phone], MRegistration Date/Time: 10/04/2020 05:06 Weight:112 kg 
(S). Height/Length:72 inches (S). BMI:33.5        PAST 
HISTORYProblems:Bronchitis.Back Pain.Anxiety.Viral 
Disease.Tendonitis.Pharyngitis.Sinusitis.Gastroesophageal Reflux Disease.UTI - 
Urinary Tract Infection.Anxiety Reaction.Mental Illness.   Additional 
Surgeries:Cholecystectomy.   Medications:ClonazePAM Oral (Tablet 1 mg) 1 tablet,
  at bedtime, last dose  last night.Protonix Oral (Tablet Delayed Release 40 
mg),  daily, last dose  this am.SEROquel Oral (Tablet 25 mg) 1 tablet,  at 
bedtime, last dose  last night.Zoloft Oral (Tablet 100 mg) 1 tablet,  at 
bedtime, last dose  this am.  Allergies:No Known Environmental 
Allergies.Vicodin. (hallucinations).   FAMILY HISTORYNo significant family 
medical history.        (Electronically signed by Tyler Limon P.A. 
10/04/2020 06:48)   









          Name      Value     Range     Interpretation Code Description Data Janice

rce(s) Supporting 

Document(s)

 

                                                                       









                    ID                  Date                Data Source

 

                    [id number] 10/04/2020 05:23:00 AM EDT [facility]

ital

 

                                        TSYSORDER [id number] 









          Name      Value     Range     Interpretation Code Description Data [facility]

rce(s) Supporting 

Document(s)

 

          URINE COLOR. [facility]  

 

          URINE APPEARANCE CLEAR                                   [facility]

l  

 

          SPECIFIC GRAVITY,URINE 1.020     1.001-1.035                     [facility]  

 

          URINE LEUKOCYTE ESTERASE NEGATIVE  NEGATIVE                      [facility]  

 

          URINE NITRATE NEGATIVE  NEGATIVE                      [facility]  

 

          PH,URINE  6.5       5.0-9.0                       [facility]  

 

          URINE PROTEIN NEGATIVE mg/dL NEGATIVE                      [facility]

sarthak  

 

          URINE GLUCOSE (UA) NEGATIVE mg/dL NEGATIVE                      [facility]  

 

          URINE KETONE NEGATIVE mg/dL NEGATIVE                      [facility]

al  

 

          URINE UROBILINOGEN NORMAL(0.2-1) mg/dL 0-1                           R

[facility]  

 

          URINE BILIRUBIN NEGATIVE  NEGATIVE                      [facility]

  

 

          URINE BLOOD NEGATIVE  NEGATIVE                      [facility]  









                    ID                  Date                Data Source

 

                    [id number]       2020 07:39:00 AM EDT [facility]

l

 

                                          Patient: ANITHA SILVER Observation

 Report - Physicians/Mid Levels MRN: 

[id number].Visit[facility]: [id number] [place] [address] [phone], MRegistration Date/Time: 2020 19:32 Weight:112 kg 
(S). Height/Length:72 inches (S). BMI:33.5        PAST HISTORYProblems:Back 
Pain.Bronchitis.Anxiety.Anxiety Reaction.Mental Illness.Tendonitis.Viral 
Disease.Pharyngitis.Sinusitis.   Additional Surgeries:Cholecystectomy.   
Medications:ClonazePAM Oral (Tablet 1 mg) 1 tablet,  at bedtime, last dose  last
 night.Protonix Oral (Tablet Delayed Release 40 mg),  daily, last dose  this 
am.SEROquel Oral (Tablet 25 mg) 1 tablet,  at bedtime, last dose  last 
night.Zoloft Oral (Tablet 100 mg) 1 tablet,  at bedtime, last dose  this am.  
Allergies:No Known Environmental Allergies.Vicodin. (hallucinations).   FAMILY 
HISTORYNegative. No significant family medical history.        (Electronically 
signed by RICCARDO Verma 2020 07:30)   









          Name      Value     Range     Interpretation Code Description Data Janice

rce(s) Supporting 

Document(s)

 

                                                                       









                    ID                  Date                Data Source

 

                    [id number]   2020 11:24:00 AM EDT [facility]

sarthak









          Name      Value     Range     Interpretation Code Description Data Janice

rce(s) Supporting 

Document(s)

 

          ORGANISM                                          [facility]  

 

          COLONY COUNT                     N                   [facility] 

 









                    ID                  Date                Data Source

 

                    [id number] 2020 06:05:00 AM EDT [facility]

al









          Name      Value     Range     Interpretation Code Description Data Janice

rce(s) Supporting 

Document(s)

 

          CHLAMYDIA TRACHOMATIS, ARMANDO Negative  Negative                      [facility]  

 

          NEISSERIA GONORRHOEAE, ARMANDO Negative  Negative                      [facility]  

 

                                        Performed at:  RN - [facility] [place] [id number] 

Director: Araceli B Reyes MD, Phone:  [phone] 









                    ID                  Date                Data Source

 

                    [id number]         2020 06:05:00 AM EDT [facility]









          Name      Value     Range     Interpretation Code Description Data Janice

rce(s) Supporting 

Document(s)

 

          Chlamydia/GC Amplification                                         Lab

[facility]    

 

                                           TESTS               RESULT  FLAG  UNI

TS    REF RANGE  

LAB------------------------------------------------------------C trachomatis, 
ARMANDO    Negative               (Negative)   01N gonorrhoeae, ARMANDO    Negative     
          (Negative)   
01------------------------------------------------------------    FLAG LEGEND:  
  L-Low Normal,H-High Normal,LL-Alert Low,HH-Alert High    <-Panic Low,>-Panic 
High,A-Abnormal,AA-Critical 
Abnormal------------------------------------------------------------Performed 
at:01 RN    [facility] [address]  [address]   Araceli B Reyes MD, Phone: [phone] 









                    ID                  Date                Data Source

 

                    [id number]   2020 08:15:00 PM EDT [facility]

l

 

                                        TSYSORDER [id number] 









          Name      Value     Range     Interpretation Code Description Data Janice

rce(s) Supporting 

Document(s)

 

          URINE RBC 5-10 /hpf 0-3       H                   [facility]  

 

          URINE WBC 5-10 /hpf 0-5       H                   [facility]  

 

          URINE EPITHELIAL CELLS 2+ /hpf   0                             [facility]

ospital  

 

          URINE BACTERIA 3+        NONE SEEN                     [facility] 

 

 

                                        CULTURE ADDED TO SAMPLE. 









                    ID                  Date                Data Source

 

                    [id number] 2020 08:11:00 PM EDT [facility]

ital

 

                                        TSYSORDER [id number] 









          Name      Value     Range     Interpretation Code Description Data Janice

rce(s) Supporting 

Document(s)

 

          URINE COLOR. YELLOW                                  [facility]  

 

          URINE APPEARANCE SLIGHTY CLOUDY                               [facility]

[facility]  

 

          SPECIFIC GRAVITY,URINE 1.015     1.001-1.035                     [facility]  

 

          URINE LEUKOCYTE ESTERASE 1+(SMALL) NEGATIVE  [facility]  

 

          URINE NITRATE NEGATIVE  NEGATIVE                      [facility]  

 

          PH,URINE  5.5       5.0-9.0                       [facility]  

 

          URINE PROTEIN TRACE mg/dL NEGATIVE  [facility]

  

 

          URINE GLUCOSE (UA) NEGATIVE mg/dL NEGATIVE                      [facility]  

 

          URINE KETONE NEGATIVE mg/dL NEGATIVE                      [facility]

al  

 

          URINE UROBILINOGEN NORMAL(0.2-1) mg/dL 0-1                           [facility]  

 

          URINE BILIRUBIN NEGATIVE  NEGATIVE                      [facility]

  

 

          URINE BLOOD 2+(MODERATE) NEGATIVE  [facility] 

 







                                        Procedure

 

                                          



                                                                                
                                                                                
                                                                                
                                                          



Vital Signs

          



                    ID                  Date                Data Source

 

                    UNK                                      









           Name       Value      Range      Interpretation Code Description Data

 Source(s)

 

           Respiratory rate 18 /min                          18 /min    eCW1 ([facility])

 

           Heart rate 66 /min                          66 /min    eCW1 ([facility])

 

           Body mass index (BMI) [Ratio] 42.82 kg/m2                       42.82

 kg/m2 eCW1 ([facility])

 

           Body weight 290 [lb_av]                       290 [lb_av] eCW1 ([facility])

 

           Body height 69 [in_i]                        69 [in_i]  eCW1 ([facility])









                    ID                  Date                Data Source

 

                    [id number]           2020 11:13:00 AM EST [facility]

l









           Name       Value      Range      Interpretation Code Description Data

 Source(s)

 

           WEIGHT     113.39 kilos                       113.39 kilos [facility]

ital

 

           HEIGHT     182.88 centimeters                       182.88 centimeter

[facility]

 

           WEIGHT     113.39 kilos                       113.39 kilos [facility]

ital

 

           HEIGHT     182.88 centimeters                       182.88 centimeter

[facility]









                    ID                  Date                Data Source

 

                    [id number]           2020 11:13:00 AM EST [facility]

l









           Name       Value      Range      Interpretation Code Description Data

 Source(s)

 

           WEIGHT     117.93 kilos                       117.93 kilos [facility]

ital

 

           HEIGHT     182.88 centimeters                       182.88 centimeter

[facility]

 

           WEIGHT     117.93 kilos                       117.93 kilos [facility]

ital

 

           HEIGHT     182.88 centimeters                       182.88 centimeter

[facility]



                                                                                
                                      



Patient Treatment Plan of Care

          



             Planned Activity Planned Date Details      Description  Data Source

(s)

 

             physical therapy eval and tx - 10/08/2020 12:00:00 AM EDT          

                 eCW1 ([facility])

 

             atorvastatin 20 MG Oral Tablet [Lipitor] 10/08/2020 12:00:00 AM EDT

                           eCW1 

([facility])

 

                          Tamsulosin hydrochloride 0.4 MG Oral Capsule [Flomax] 

10/08/2020 12:00:00 AM EDT

                                                            eCW1 ([facility])

 

             physical therapy eval and tx - 10/08/2020 12:00:00 AM EDT          

                 eCW1 ([facility])

 

             physical therapy eval and tx - 10/08/2020 12:00:00 AM EDT          

                 eCW1 ([facility])

## 2021-02-01 NOTE — CCD
Summarization of Episode Note

                             Created on: 12/15/2020



AmandoMr. Oleksandr reyna

External Reference #: [id number]

: 1975

Sex: Male



Demographics





                          Address                   [address] [place]  [address]

 

                          Home Phone                [phone]

 

                          Preferred Language        Unknown

 

                          Marital Status            Unknown

 

                          Jehovah's witness Affiliation     Unknown

 

                          Race                      White

 

                          Ethnic Group              Not  or 





Author





                          Author                    [facility]

 

                          Organization              [facility]

 

                          Address                   Unknown

 

                          Phone                     Unavailable







Support





                Name            Relationship    Address         Phone

 

                    Oleksandr Goel     GUAR                [address] [place]  [address]                    [phone]

 

                    Ameena Goel       ECON                [address] [place]  [address]                    [phone]







Care Team Providers





                    Care Team Member Name Role                Phone

 

                    Seda Irving Unavailable         [phone]







PROBLEMS





          Type      Condition ICD9-CM Code NFY48-MD Code Onset Dates Condition S

tatus SNOMED 

Code                                    Notes

 

          Problem   Morbid (severe) obesity due to excess calories           E66

.01              Active    

[id number]                                

 

        Problem Encounter for immunization         Z23             Active  77159

6002  

 

        Problem Allergic rhinitis         J30.9           Active  06208246  

 

        Problem Weight loss         R63.4           Active  89600212  

 

        Problem Vaccine counseling         Z71.89          Active  560552120  

 

        Problem Hyperlipidemia         E78.5           Active  43789795  

 

        Problem Panic disorder without agoraphobia         F41.0           Activ

e  88230240  

 

        Problem Post-traumatic stress disorder         F43.10          Active  4

8558785  

 

        Problem Body mass index (BMI) 45.0-49.9, adult         Z68.42          A

ctive  913057606  

 

        Problem Panic           F41.0           Active  32208961  

 

        Problem BMI 40.0-44.9, adult         Z68.41          Active  526595898  

 

        Problem Sciatica, left side         M54.32          Active  39824718  

 

        Problem Anxiety disorder, unspecified         F41.9           Active  19

0743569  

 

        Problem Sciatica, right side         M54.31          Active  [id number]

095189  

 

        Problem Borderline personality disorder         F60.3           Active  

40122018  

 

        Problem Other and unspecified hyperlipidemia 272.4                   Act

bon  80735224  

 

          Problem   Gastro-esophageal reflux disease without esophagitis        

   K21.9               Active    

341883787                                

 

           Problem    Adjustment disorder with mixed anxiety and depressed mood 

           F43.23                

Active                    020246776                 r/t pandemic

 

                          Problem                   Class 2 obesity due to exces

s calories without serious comorbidity in 

adult, unspecified BMI            E66.09                Active     [id number]   

 

                          Problem                   Benign prostatic hyperplasia

, unspecified whether lower urinary tract 

symptoms present            N40.0                 Active     783438018   







ALLERGIES

No Known Allergies



ENCOUNTERS from 1975 to 2020-12-10





             Encounter    Location     Date         Provider     Diagnosis

 

                    [address] [address] 10 

Dec, 2020                 Seda Irving        







IMMUNIZATIONS





                Vaccine         Route           Administration Date Status

 

                INFLUENZA 3 YRS AND OLDER Preservative free Unknown         Dec 

04, 2018    Administered

 

                INFLUENZA 3 YRS AND OLDER Preservative free IM Intramuscular 2016    

Administered







SOCIAL HISTORY

Sex Assigned At Birth:



                          Social History Observation Description

 

                          Sex Assigned At Birth     Unknown



Alcohol Screen



                    Question            Answer              Notes

 

                    Did you have a drink containing alcohol in the past year? No

                   

 

                    Points              0                    

 

                    Interpretation      Negative             







REASON FOR REFERRAL

No Information



VITAL SIGNS

No information



MEDICATIONS





           Medication SIG (Take, Route, Frequency, Duration) Notes      Start Da

te End Date   

Status

 

                          Albuterol Sulfate  (90 Base) MCG/ACT 2 puffs as

 needed Inhalation every 4

hrs for 17                                                      Active

 

           Halcion 0.25 MG 1 tablet Orally qhs prn only            10 Mar, 2020 

           Active

 

           Lipitor 20 MG 1 tablet Orally Once a day for 90 days            2020            Active

 

           HydrOXYzine HCl 25 MG 2 Orally every 8 hrs               

         Active

 

           physical therapy eval and tx - - - -3x per week for -6 weeks         

   08 Oct, 2020            

Active

 

           Pantoprazole Sodium 40 MG 1 tablet Orally Once a day for 30 days     

                             Active

 

           Seroquel 100 MG ii tablet Orally HS                                  

Active

 

                          Saphris 10 MG             1 tablet under the tongue an

d allow to dissolve Sublingual Twice a

day                                                             Active

 

           Flomax 0.4 MG 1 capsule Orally Once a day for 90 day(s)            08

 Oct, 2020            Active

 

             Lithium Carbonate 300 MG TAKE ONE TABLET BY MOUTH IN THE EVENING Or

ally qd                            

                                        Active

 

           Propranolol HCl 20 MG 1 tablet Orally tid                

        Active

 

           physical therapy eval and tx - - - -3x per week for -6 weeks         

   08 Oct, 2020            

Active

 

           Zoloft 100 MG TAKE ONE TABLET BY MOUTH EVERY DAY Orally Once a day   

                               Active







PROCEDURES

No Information



RESULTS

No Results



REASON FOR VISIT

Disability Records



MEDICAL (GENERAL) HISTORY





                    Type                Description         Date

 

                    Medical History     GERD                 

 

                    Medical History     Anxiety              

 

                    Medical History     Obesity              

 

                    Medical History     Allergic rhinitis    

 

                    Surgical History    Laparscopic Cholecystectomy 11/15/12

 

                    Hospitalization History Chest pain           







Goals Section

No Information



Health Concerns

No Information



MEDICAL EQUIPMENT

No Information



MENTAL STATUS

No Information



FUNCTIONAL STATUS

No Information



ASSESSMENTS

No Information



PLAN OF TREATMENT

Next Appt



                                        Details

 

                                        Provider Name:Seda Irving,  01:20:00 PM, [address] [place] [address], [id number], [phone]







Insurance Providers





             Payer Name   Payer Address Payer Phone  Insured Name Patient Relati

onship to 

Insured                   Coverage Start Date       Coverage End Date

 

                    [employer] [address] [place] [phone] 

[phone]    Oleksandr Goel self

## 2021-02-01 NOTE — REP
INDICATION:

back pain, urinary hesitancy



COMPARISON:

None



TECHNIQUE:

Axial noncontrast images from the lung bases to the pubic symphysis with coronal and

sagittal reformations.



This CT examination was performed using the following dose reduction techniques:

Automated exposure control, adjustment of mA and/or kv according to the patient's

size, and use of iterative reconstruction technique.



FINDINGS:

Lung bases demonstrate chronic interstitial changes along with reticulonodular tree in

bud infiltrative pattern (left greater than right) suggesting an acute pneumonitis.

No significant focal consolidation or effusion.



Liver, spleen, pancreas, bilateral adrenal glands and kidneys are normal.



The enteric system is unremarkable and without obstruction or acute inflammatory

process. Normal terminal ileum and appendix identified in the right lower quadrant.

Scattered sigmoid diverticula noted without acute diverticulitis.



Pelvis demonstrates normal bladder and age-appropriate prostate/seminal vesicles.



No ascites.  No free air. No adenopathy. No focal inflammatory stranding.  Abdominal

aorta without aneurysm.  Musculoskeletal structures are intact and without acute

osseous abnormality.



IMPRESSION:

1. Changes at the lung bases as described above suggesting acute pneumonitis and

should be correlated clinically.

2. No acute abdominopelvic pathology appreciated.

3. Sigmoid diverticulosis without acute diverticulitis.

4. Normal appearance to the urinary tract system.





<Electronically signed by Twan Deras > 02/01/21 0984

## 2021-02-01 NOTE — CCD
Summarization Of Episode

                             Created on: 2021



ANITHA SILVER

External Reference #: [id number]

: 1975

Sex: Male



Demographics





                          Address                   [address] [place]  [address]

 

                          Home Phone                [phone]

 

                          Preferred Language        English

 

                          Marital Status            

 

                          Mu-ism Affiliation     [place]

 

                          Race                      White

 

                          Ethnic Group              Not  or 





Author





                          Author                    [facility]

 

                          Organization              [facility]

 

                          Address                   Unknown

 

                          Phone                     Unavailable







Support





                Name            Relationship    Address         Phone

 

                DISABLED        Next Of Kin     Unknown         Unavailable

 

                    SONNY SILVER Next Of Kin         [address] [place]  [address]                    [phone]

 

                UE              Next Of Kin     Unknown         Unavailable

 

                    SAPPHIRE SILVER    Next Of Kin         [address] [place]  [address]                    [phone]

 

                    ANJALI SILVER      Next Of Kin         [address] [place]  [address]                    [phone]

 

                    BHAVANI SILVER                [address] [place]  [address]                    Unavailable







Care Team Providers





                    Care Team Member Name Role                Phone

 

                    SYMENOW, G CHRISTOPHER PA Unavailable         Unavailable

 

                    SYMENOW, G CHRISTOPHER PA Unavailable         Unavailable

 

                    SYMENOW, G CHRISTOPHER PA Unavailable         Unavailable

 

                    SYMENOW, G CHRISTOPHER PA Unavailable         Unavailable

 

                    SYMENOW, G CHRISTOPHER PA Unavailable         Unavailable

 

                    SYMENOW, G CHRISTOPHER PA Unavailable         Unavailable

 

                    SYMENOW, G CHRISTOPHER PA Unavailable         Unavailable

 

                    SYMENOW, G CHRISTOPHER PA Unavailable         Unavailable

 

                    SYMENOW, G CHRISTOPHER PA Unavailable         Unavailable

 

                    SYMENOW, G CHRISTOPHER PA Unavailable         Unavailable

 

                    SYMENOW, G CHRISTOPHER PA Unavailable         Unavailable

 

                    SYMENOW, G CHRISTOPHER PA Unavailable         Unavailable

 

                    SYMENOW, G CHRISTOPHER PA Unavailable         Unavailable

 

                    SYMENOW, G CHRISTOPHER PA Unavailable         Unavailable

 

                    SYMENOW, G CHRISTOPHER PA Unavailable         Unavailable

 

                    SYMENOW, G CHRISTOPHER PA Unavailable         Unavailable

 

                    SYMENOW, G CHRISTOPHER PA Unavailable         Unavailable

 

                    HODANEVA PA Unavailable         Unavailable

 

                    HODANEVA PA Unavailable         Unavailable

 

                    HODANEVA PA Unavailable         Unavailable

 

                    HODANEVA PA Unavailable         Unavailable

 

                    HODANEVA PA Unavailable         Unavailable

 

                    HODANEVA PA Unavailable         Unavailable

 

                    HODANEVA PA Unavailable         Unavailable

 

                    HODANEVA PA Unavailable         Unavailable

 

                    HODANEVA PA Unavailable         Unavailable

 

                    HODANEVA PA Unavailable         Unavailable

 

                    HODANEVA PA Unavailable         Unavailable

 

                    HODANEVA PA Unavailable         Unavailable

 

                    HODANEVA PA Unavailable         Unavailable

 

                    HODAN, L TOM PA Unavailable         Unavailable

 

                    HODAN, L TOM PA Unavailable         Unavailable

 

                    HODAN, L TOM PA Unavailable         Unavailable

 

                    HODAN, L TOM PA Unavailable         Unavailable

 

                    HODAN, L TOM PA Unavailable         Unavailable

 

                    HODAN, L TOM PA Unavailable         Unavailable

 

                    DOMINGO,  RUBY PA  Unavailable         Unavailable

 

                    DOMINGO,  RUBY PA  Unavailable         Unavailable

 

                    DOMINGO,  RUBY PA  Unavailable         Unavailable

 

                    DOMINGO,  RUBY PA  Unavailable         Unavailable

 

                    DOMINGO,  RUBY PA  Unavailable         Unavailable

 

                    DOMINGO,  RUBY PA  Unavailable         Unavailable

 

                    DOMINGO,  RUBY PA  Unavailable         Unavailable

 

                    DOMINGO,  RUBY PA  Unavailable         Unavailable

 

                    DOMINGO,  RUBY PA  Unavailable         Unavailable

 

                    DOMINGO,  RUBY PA  Unavailable         Unavailable

 

                    DOMINGO,  RUBY PA  Unavailable         Unavailable

 

                    DOMINGO,  RUBY PA  Unavailable         Unavailable

 

                    DOMINGO,  RUBY PA  Unavailable         Unavailable

 

                    DOMINGO,  RUBY PA  Unavailable         Unavailable

 

                    DOMINGO,  RUBY PA  Unavailable         Unavailable

 

                    EVA KASPER MD Unavailable         Unavailable

 

                    EVA KASPER MD Unavailable         Unavailable

 

                    EVA KASPER MD Unavailable         Unavailable

 

                    MARIE, W SCOTT PA Unavailable         Unavailable

 

                    MARIE, W SCOTT PA Unavailable         Unavailable

 

                    MARIE, W SCOTT PA Unavailable         Unavailable

 

                    MARIE, W SCOTT PA Unavailable         Unavailable

 

                    MARIE, W SCOTT PA Unavailable         Unavailable

 

                    MARIE, W SCOTT PA Unavailable         Unavailable

 

                    MARIE, W SCOTT PA Unavailable         Unavailable

 

                    MARIE, W SCOTT PA Unavailable         Unavailable

 

                    MARIE, W SCOTT PA Unavailable         Unavailable

 

                    MARIE, W SCOTT PA Unavailable         Unavailable

 

                    MARIE, W SCOTT PA Unavailable         Unavailable

 

                    MARIE, W SCOTT PA Unavailable         Unavailable

 

                    MARIE, W SCOTT PA Unavailable         Unavailable

 

                    MARIE, W SCOTT PA Unavailable         Unavailable

 

                    MARIE, W SCOTT PA Unavailable         Unavailable

 

                    MARIE, W SCOTT PA Unavailable         Unavailable

 

                    MARIE, W SCOTT PA Unavailable         Unavailable

 

                    MARIE, W SCOTT PA Unavailable         Unavailable

 

                    MARIE, W SCOTT PA Unavailable         Unavailable

 

                    MARIE, W SCOTT PA Unavailable         Unavailable

 

                    MARIE, W SCOTT PA Unavailable         Unavailable

 

                    MARIE, W SCOTT PA Unavailable         Unavailable

 

                    MARIE, W SCOTT PA Unavailable         Unavailable

 

                    MARIE, W SCOTT PA Unavailable         Unavailable

 

                    MARIE, W SCOTT PA Unavailable         Unavailable

 

                    MARIE, W SCOTT PA Unavailable         Unavailable

 

                    MARIE, W SCOTT PA Unavailable         Unavailable

 

                    MARIE, W SCOTT PA Unavailable         Unavailable

 

                    MARIE, W SCOTT PA Unavailable         Unavailable

 

                    MARIE, W SCOTT PA Unavailable         Unavailable

 

                    MARIE, W SCOTT PA Unavailable         Unavailable

 

                    MARIE, W SCOTT PA Unavailable         Unavailable

 

                    MARIE, W SCOTT PA Unavailable         Unavailable

 

                    MARIE, W SCOTT PA Unavailable         Unavailable

 

                    MARIE, W SCOTT PA Unavailable         Unavailable

 

                    MARIE, W SCOTT PA Unavailable         Unavailable

 

                    MARIE, W SCOTT PA Unavailable         Unavailable

 

                    MARIE, W SCOTT PA Unavailable         Unavailable

 

                    MARIE, W SCOTT PA Unavailable         Unavailable

 

                    MARIE, W SCOTT PA Unavailable         Unavailable

 

                    MARIE, W SCOTT PA Unavailable         Unavailable

 

                    MARIE, W SCOTT PA Unavailable         Unavailable

 

                    MARIE, W SCOTT PA Unavailable         Unavailable

 

                    MARIE, W SCOTT PA Unavailable         Unavailable

 

                    MARIE, W SCOTT PA Unavailable         Unavailable

 

                    MARIE, W SCOTT PA Unavailable         Unavailable

 

                    ASHLY, LUIS TYLER PA Unavailable         Unavailable

 

                    ASHLY, LUIS TYLER PA Unavailable         Unavailable

 

                    ASHLY, LUIS TYLER PA Unavailable         Unavailable

 

                    ASHLY, LUIS TLYER PA Unavailable         Unavailable

 

                    ASHLY, LUIS TYLER PA Unavailable         Unavailable

 

                    ASHLY, LUIS TYLER PA Unavailable         Unavailable

 

                    ASHLY, LUIS TYLER PA Unavailable         Unavailable

 

                    ASHLY, LUIS TYLER PA Unavailable         Unavailable

 

                    ASHLY, LUIS TYLER PA Unavailable         Unavailable

 

                    ASHLY, LUIS TYLER PA Unavailable         Unavailable

 

                    ASHLY, LUIS TYLER PA Unavailable         Unavailable

 

                    ASHLY, LUIS TYLER PA Unavailable         Unavailable

 

                    ASHLY, LUIS TYLER PA Unavailable         Unavailable

 

                    ASHLY, LUIS TYLER PA Unavailable         Unavailable

 

                    ASHLY, LUIS TYLER PA Unavailable         Unavailable

 

                    ASHLY, LUIS TYLER PA Unavailable         Unavailable

 

                    ASLHY, LUIS TYLER PA Unavailable         Unavailable

 

                    ASHLY, LUIS TYLER PA Unavailable         Unavailable

 

                    ASHLY, LUIS TYLER PA Unavailable         Unavailable

 

                    ASHLY, LUIS TYLER PA Unavailable         Unavailable

 

                    ASHLY, LUIS TYLER PA Unavailable         Unavailable

 

                    TACHO KHAN MD    Unavailable         Unavailable

 

                    TACHO KHAN MD    Unavailable         Unavailable

 

                    TACHO KHAN MD    Unavailable         Unavailable

 

                    TACHO KHAN MD    Unavailable         Unavailable

 

                    TACHO KHAN MD    Unavailable         Unavailable

 

                    TACHO KHAN MD    Unavailable         Unavailable

 

                    TACHO KHAN MD    Unavailable         Unavailable

 

                    TACHO KHAN MD    Unavailable         Unavailable

 

                    TACHO KHAN MD    Unavailable         Unavailable

 

                    OLIVAREZ SR, ANITHA CABRAL MD Unavailable         Unavailable

 

                    OLIVAREZ SR, ANITHA CABRAL MD Unavailable         Unavailable

 

                    OLIVAREZ SR, ANITHA CABRAL MD Unavailable         Unavailable

 

                    OLIVAREZ SR, ANITHA CABRAL MD Unavailable         Unavailable

 

                    OLIVAREZ SR, ANITHA CABRAL MD Unavailable         Unavailable

 

                    OLIVAREZ SR, ANITHA CABRAL MD Unavailable         Unavailable

 

                    OLIVAREZ SR, ANITHA CABRAL MD Unavailable         Unavailable

 

                    OLIVAREZ SR, ANITHA CABRAL MD Unavailable         Unavailable

 

                    OLIVAREZ SR, ANITHA CABRAL MD Unavailable         Unavailable

 

                    OLIVAREZ SR, ANITHA CABRAL MD Unavailable         Unavailable

 

                    OLIVAREZ SR, ANITHA CABRAL MD Unavailable         Unavailable

 

                    OLIVAREZ SR, ANITHA CABRAL MD Unavailable         Unavailable

 

                    OLIVAREZ SR, ANITHA CABRAL MD Unavailable         Unavailable

 

                    OLIVAREZ SR, ANITHA CABRAL MD Unavailable         Unavailable

 

                    OLIVAREZ SR, ANITHA CABRAL MD Unavailable         Unavailable

 

                    OLIVAREZ SR, ANITHA CABRAL MD Unavailable         Unavailable

 

                    OLIVAREZ SR, ANITHA CABRAL MD Unavailable         Unavailable

 

                    OLIVAREZ SR, ANITHA CABRAL MD Unavailable         Unavailable

 

                    OLIVAREZ SR, ANITHA CABRAL MD Unavailable         Unavailable

 

                    OLIVAREZ SR, ANITHA CABRAL MD Unavailable         Unavailable

 

                    OLIVAREZ SR, ANITHA CABRAL MD Unavailable         Unavailable

 

                    OLIVAREZ SR, ANITHA CABRAL MD Unavailable         Unavailable

 

                    OLIVAREZ SR, ANITHA CABRAL MD Unavailable         Unavailable

 

                    OLIVAREZ SR, ANITHA CABRAL MD Unavailable         Unavailable

 

                    OLIVAREZ SR, ANITHA CABRAL MD Unavailable         Unavailable

 

                    OLIVAREZ SR, ANITHA CABRAL MD Unavailable         Unavailable

 

                    OLIVAREZ SR, ANITHA CABRAL MD Unavailable         Unavailable

 

                    OILVAREZ SR, ANITHA CABRAL MD Unavailable         Unavailable

 

                    OLIVAREZ SR, ANITHA CABRAL MD Unavailable         Unavailable

 

                    OLIVAREZ SR, ANITHA CABRAL MD Unavailable         Unavailable

 

                    OLIVAREZ SR, ANITHA CABRAL MD Unavailable         Unavailable

 

                    OLIVAREZ SR, ANITHA CABRAL MD Unavailable         Unavailable

 

                    OLIVAREZ SR, ANITHA CABRAL MD Unavailable         Unavailable

 

                    OLIVAREZ SR, ANITHA CABRAL MD Unavailable         Unavailable

 

                    OLIVAREZ SR, ANITHA CABRAL MD Unavailable         Unavailable

 

                    OLIVAREZ SR, ANITHA CABRAL MD Unavailable         Unavailable

 

                    OLIVRAEZ SR, ANITHA CABRAL MD Unavailable         Unavailable

 

                    OLIVAREZ SR, ANITHA CABRAL MD Unavailable         Unavailable

 

                    OLIVARZE SR, ANITHA CABRAL MD Unavailable         Unavailable

 

                    OLIVAREZ SR, ANITHA CABRAL MD Unavailable         Unavailable

 

                    OLIVAREZ SR, ANITHA CABRAL MD Unavailable         Unavailable

 

                    OLIVAREZ SR, ANITHA CABRAL MD Unavailable         Unavailable

 

                    OLIVAREZ SR, ANITHA CABRAL MD Unavailable         Unavailable

 

                    OLIVAREZ SR, ANITHA CABRAL MD Unavailable         Unavailable

 

                    OLIVAREZ SR, ANITHA CABRAL MD Unavailable         Unavailable

 

                    OLIVAREZ SR, ANITHA CABRAL MD Unavailable         Unavailable

 

                    OLIVAREZ SR, ANITHA CABRAL MD Unavailable         Unavailable

 

                    OLIVAREZ SR, ANITHA CABRAL MD Unavailable         Unavailable

 

                    OLIVAREZ SR, ANITHA CABRAL MD Unavailable         Unavailable

 

                    OLIVAREZ SR, ANITHA CABRAL MD Unavailable         Unavailable

 

                    OLIVAREZ SR, ANITHA CABRAL MD Unavailable         Unavailable

 

                    OLIVAREZ SR, ANITHA CABRAL MD Unavailable         Unavailable

 

                    OLIVAREZ SR, ANITHA CABRAL MD Unavailable         Unavailable

 

                    OLIVAREZ SR, ANITHA CABRAL MD Unavailable         Unavailable

 

                    Braxton, M Breana RPA   Unavailable         Unavailable

 

                    Braxton, M Breana RPA   Unavailable         Unavailable

 

                    Braxton, M Breana RPA   Unavailable         Unavailable

 

                    Braxton, M Breana RPA   Unavailable         Unavailable

 

                    Braxton, M Breana RPA   Unavailable         Unavailable

 

                    Braxton, M Breana RPA   Unavailable         Unavailable

 

                    Braxton, M Breana RPA   Unavailable         Unavailable

 

                    Braxton, M Breana RPA   Unavailable         Unavailable

 

                    Braxton, M Breana RPA   Unavailable         Unavailable

 

                    Braxton, M Breana RPA   Unavailable         Unavailable

 

                    Braxton, M Breana RPA   Unavailable         Unavailable

 

                    Braxton, M Breana RPA   Unavailable         Unavailable

 

                    Braxton, M Breana RPA   Unavailable         Unavailable

 

                    Braxton, M Breana RPA   Unavailable         Unavailable

 

                    Braxton, M Breana RPA   Unavailable         Unavailable

 

                    Braxton, M Breana RPA   Unavailable         Unavailable

 

                    Braxton, M Breana RPA   Unavailable         Unavailable

 

                    Braxton, M Breana RPA   Unavailable         Unavailable

 

                    Braxton, M Breana RPA   Unavailable         Unavailable

 

                    Braxton, M Brenaa RPA   Unavailable         Unavailable

 

                    Braxton, M Breana RPA   Unavailable         Unavailable

 

                    Braxton, M Breana RPA   Unavailable         Unavailable

 

                    Braxton, M Breana RPA   Unavailable         Unavailable

 

                    Braxton, M Breana RPA   Unavailable         Unavailable

 

                    Braxton, M Breana RPA   Unavailable         Unavailable

 

                    Braxton, M Breana RPA   Unavailable         Unavailable

 

                    Braxton, M Breana RPA   Unavailable         Unavailable

 

                    Braxton, M Breana RPA   Unavailable         Unavailable

 

                    Braxton, M Breana RPA   Unavailable         Unavailable

 

                    Braxton, M Breana RPA   Unavailable         Unavailable

 

                    Braxton, M Breana RPA   Unavailable         Unavailable

 

                    Braxton, M Breana RPA   Unavailable         Unavailable

 

                    Braxton, M Breana RPA   Unavailable         Unavailable

 

                    Braxton, M Breana RPA   Unavailable         Unavailable

 

                    Braxton, M Breana RPA   Unavailable         Unavailable

 

                    Braxton, M Breaan RPA   Unavailable         Unavailable

 

                    Braxton, M Breana RPA   Unavailable         Unavailable

 

                    Braxton, M Breana RPA   Unavailable         Unavailable

 

                    Braxton, M Breana RPA   Unavailable         Unavailable

 

                    Braxton, M Breana RPA   Unavailable         Unavailable

 

                    Braxton, M Breana RPA   Unavailable         Unavailable

 

                    JOS VALENTINE MD Unavailable         Unavailable

 

                    JOS VALENTINE MD Unavailable         Unavailable

 

                    JOS VALENTINE MD Unavailable         Unavailable

 

                    JOS VALENTINE MD Unavailable         Unavailable

 

                    JOS VALENTINE MD Unavailable         Unavailable

 

                    JOS VALENTINE MD Unavailable         Unavailable

 

                    JOS VALENTINE MD Unavailable         Unavailable

 

                    JOS VALENTINE MD Unavailable         Unavailable

 

                    JOS VALENTINE MD Unavailable         Unavailable

 

                    JOS VALENTINE MD Unavailable         Unavailable

 

                    MEEK, SYED NICOLE FNP-C, MSN Unavailable         Unavailab

le

 

                    MEEK, SYED NICOLE FNP-C, MSN Unavailable         Unavailab

le

 

                    MEEK, SYED NICOLE FNP-C, MSN Unavailable         Unavailab

le

 

                    MEEK, SYED NICOLE FNP-C, MSN Unavailable         Unavailab

le

 

                    MEEK, SYED NICOLE FNP-C, MSN Unavailable         Unavailab

le

 

                    MEEK, SYED NICOLE FNP-C, MSN Unavailable         Unavailab

le

 

                    MEEK, SYED NICOLE FNP-C, MSN Unavailable         Unavailab

le

 

                    MEEK, SYED NICOLE FNP-C, MSN Unavailable         Unavailab

le

 

                    MEEK, SYED NICOLE FNP-C, MSN Unavailable         Unavailab

le

 

                    MEEK, SYED NICOLE FNP-C, MSN Unavailable         Unavailab

le

 

                    MEEK, SYED NICOLE FNP-C, MSN Unavailable         Unavailab

le

 

                    MEEK, SYED NICOLE FNP-C, MSN Unavailable         Unavailab

le

 

                    MEEK, SYED NICOLE FNP-C, MSN Unavailable         Unavailab

le

 

                    MEEK, SYED NICOLE FNP-C, MSN Unavailable         Unavailab

le

 

                    MEEK, SYED NICOLE FNP-C, MSN Unavailable         Unavailab

le

 

                    MEEK, SYED NICOLE FNP-C, MSN Unavailable         Unavailab

le

 

                    MEEK, SYED NICOLE FNP-C, MSN Unavailable         Unavailab

le

 

                    MEEK, SYED NICOLE FNP-C, MSN Unavailable         Unavailab

le

 

                    MEEK, SYED NICOLE FNP-C, MSN Unavailable         Unavailab

le

 

                    MEEK, SYED NICOLE FNP-C, MSN Unavailable         Unavailab

le

 

                    MEEK, SYED NICOLE FNP-C, MSN Unavailable         Unavailab

le

 

                    MEEK, SYED NICOLE FNP-C, MSN Unavailable         Unavailab

le

 

                    MEEK, SYED NICOLE FNP-C, MSN Unavailable         Unavailab

le

 

                    MEEK, SYED NICOLE FNP-C, MSN Unavailable         Unavailab

le

 

                    MEEK, SYED NICOLE FNP-C, MSN Unavailable         Unavailab

le

 

                    MEEK, SYED NICOLE FNP-C, MSN Unavailable         Unavailab

le

 

                    MEEK, SYED NICOLE FNP-C, MSN Unavailable         Unavailab

le

 

                    MEEK, SYED NICOLE FNP-C, MSN Unavailable         Unavailab

le

 

                    MEEK, SYED NICOLE FNP-C, MSN Unavailable         Unavailab

le

 

                    MEEK, SYED NICOLE FNP-C, MSN Unavailable         Unavailab

le

 

                    MEEK, SYED NICOLE FNP-C, MSN Unavailable         Unavailab

le

 

                    MEEK, SYED NICOLE FNP-C, MSN Unavailable         Unavailab

le

 

                    MEEK, SYED NICOLE FNP-C, MSN Unavailable         Unavailab

le

 

                    MEEK, SYED NICOLE FNP-C, MSN Unavailable         Unavailab

le

 

                    MEEK, SYED NICOLE FNP-C, MSN Unavailable         Unavailab

le

 

                    MEEK, SYED NICOLE FNP-C, MSN Unavailable         Unavailab

le

 

                    MEEK, SYED NICOLE FNP-C, MSN Unavailable         Unavailab

le

 

                    MEEK, SYED NICOLE FNP-C, MSN Unavailable         Unavailab

le

 

                    MEEKSYED GAMBINOA FNP-C, MSN Unavailable         Unavailab

le

 

                    MEEKSYED GAMBINOA FNP-C, MSN Unavailable         Unavailab

le

 

                    MEEK, SYED JORDANA FNP-C, MSN Unavailable         Unavailab

le

 

                    MEEK, SYED JORDANA FNP-C, MSN Unavailable         Unavailab

le

 

                    MEEK, SYED JORDANA FNP-C, MSN Unavailable         Unavailab

le

 

                    ABBY, DUANE THOMAS PA-C Unavailable         Unavailable

 

                    ABBY, DUANE THOMAS PA-C Unavailable         Unavailable

 

                    ABBY, DUANE THOMAS PA-C Unavailable         Unavailable

 

                    ABBY, DUANE THOMAS PA-C Unavailable         Unavailable

 

                    ABBY, DUANE THOMAS PA-C Unavailable         Unavailable

 

                    ABBY, DUANE THOMAS PA-C Unavailable         Unavailable

 

                    ABBY, DUANE THOMAS PA-C Unavailable         Unavailable

 

                    ABBY, DUANE THOMAS PA-C Unavailable         Unavailable

 

                    ABBY, DUANE THOMAS PA-C Unavailable         Unavailable

 

                    [facility]        Unavailable         Unavailable

 

                    DESJARLAIS,  GEORGE NP Unavailable         Unavailable

 

                    DESJARLAIS,  GEORGE NP Unavailable         Unavailable

 

                    DESJARLAIS,  GEORGE NP Unavailable         Unavailable

 

                    DESJARLAIS,  GEORGE NP Unavailable         Unavailable

 

                    DESJARLAIS,  GEORGE NP Unavailable         Unavailable

 

                    DESJARLAIS,  GEORGE NP Unavailable         Unavailable

 

                    DESJARLAIS,  GEORGE NP Unavailable         Unavailable

 

                    DESJARLAIS,  GEORGE NP Unavailable         Unavailable

 

                    DESJARLAIS,  GEORGE NP Unavailable         Unavailable

 

                    Rydberg,  Anahi PA Unavailable         Unavailable

 

                    Rydberg,  Anahi PA Unavailable         Unavailable

 

                    Rydberg,  Anahi PA Unavailable         Unavailable

 

                    Rydberg,  Anahi PA Unavailable         Unavailable

 

                    Rydberg,  Anahi PA Unavailable         Unavailable

 

                    Rydberg,  Anahi PA Unavailable         Unavailable

 

                    Rydberg,  Anahi PA Unavailable         Unavailable

 

                    Rydberg,  Anahi PA Unavailable         Unavailable

 

                    Rydberg,  Anahi PA Unavailable         Unavailable

 

                    Rydberg,  Anahi PA Unavailable         Unavailable

 

                    Rydberg,  Anahi PA Unavailable         Unavailable

 

                    Rydberg,  Anahi PA Unavailable         Unavailable

 

                    Rydberg,  Anahi PA Unavailable         Unavailable

 

                    Rydberg,  Anahi PA Unavailable         Unavailable

 

                    Rydberg,  Anahi PA Unavailable         Unavailable

 

                    Rydberg,  Anahi PA Unavailable         Unavailable

 

                    Rydberg,  Anahi PA Unavailable         Unavailable

 

                    Rydberg,  Anahi PA Unavailable         Unavailable

 

                    Rydberg,  Anahi PA Unavailable         Unavailable

 

                    Rydberg,  Anahi PA Unavailable         Unavailable

 

                    Rydberg,  Anahi PA Unavailable         Unavailable

 

                    Rydberg,  Anahi PA Unavailable         Unavailable



                                  



Re-disclosure Warning

          The records that you are about to access may contain information from 
federally-assisted alcohol or drug abuse programs. If such information is 
present, then the following federally mandated warning applies: This information
has been disclosed to you from records protected by federal confidentiality 
rules (42 CFR part 2). The federal rules prohibit you from making any further 
disclosure of this information unless further disclosure is expressly permitted 
by the written consent of the person to whom it pertains or as otherwise 
permitted by 42 CFR part 2. A general authorization for the release of medical 
or other information is NOT sufficient for this purpose. The Federal rules 
restrict any use of the information to criminally investigate or prosecute any 
alcohol or drug abuse patient.The records that you are about to access may 
contain highly sensitive health information, the redisclosure of which is 
protected by Article 27-F of the [facility] Public Health law. If you 
continue you may have access to information: Regarding HIV / AIDS; Provided by 
facilities licensed or operated by the [facility] Office of Mental Health; 
or Provided by the [facility] Office for People With Developmental 
Disabilities. If such information is present, then the following [place] 
mandated warning applies: This information has been disclosed to you from 
confidential records which are protected by state law. State law prohibits you 
from making any further disclosure of this information without the specific 
written consent of the person to whom it pertains, or as otherwise permitted by 
law. Any unauthorized further disclosure in violation of state law may result in
a fine or prison sentence or both. A general authorization for the release of 
medical or other information is NOT sufficient authorization for further disc
losure.                                                                         
    



Allergies and Adverse Reactions

          



           Type       Description Substance  Reaction   Status     Data Source(s

)

 

           Drug allergy MDX - Nka - No Known Allergies MDX - Nka - No Known [facility]



                                                                                
       



Encounters

          



           Encounter  Providers  Location   Date       Indications Data Source(s

)

 

           Outpatient Attender: GEORGE MADSEN NP            2021 11:

33:00 AM [facility]

 

           Outpatient Attender: GEORGE MADSEN NP            2020 01:

20:00 PM [facility]

 

           Outpatient            [facility] 12/10/2020 12:00:00 

AM EST            eCW1 ([facility])

 

           Outpatient Attender: GEORGE MADSEN NP            2020 09:

22:00 AM [facility]

 

           Outpatient Attender: GEORGE MADSEN NP            2020 11:

40:00 AM [facility]

 

           Outpatient Attender: GEORGE MADSEN NP            2020 10:

00:00 AM [facility]

 

           Outpatient Attender: MARIANNA OLIVAREZ SR            2020 10:36:00 

AM [facility]

 

                    Emergency           Attender: RUBY LANE PAReferrer: MIKEL OLIVAREZ SR EMERGENCY ROOM-ER

                    10/27/2020 07:34:00 AM EDT - 10/27/2020 08:51:00 AM [facility]

 

                                        Patient discharged. 

 

           Outpatient Attender: GEORGE MADSEN NP            10/20/2020 11:

40:00 AM [facility]

 

                Outpatient      Attender: MARIANNA OLIVAREZ SR                 10/12

/2020 02:58:00 PM EDT - 10/28/2020 

10:36:00 AM [facility]

 

                                        Patient discharged. 

 

                Outpatient      Attender: MARIANNA OLIVAREZ SRReferrer: MARIANNA SUNG SR                 10/12/2020 

02:06:00 PM EDT - 10/12/2020 02:06:00 PM EDT                           [facility]

 

                    Outpatient          Attender: MARIANNA OLIVAREZ SRReferrer: LEONARD CARRION MSN 

EMERGENCY ROOM-[facility] 10/08/2020 12:43:00 PM EDT - 10/08/2020 12:43:00 PM [facility]

 

           Outpatient            [facility] 10/08/2020 12:00:00 

AM EDT            eCW1 ([facility])

 

                    Emergency           Attender: TYLER LIMON PAReferrer: MERLYN DEL REAL, MSN 

EMERGENCY ROOM-ER   10/04/2020 05:42:00 AM EDT - 10/04/2020 06:05:00 AM [facility]

 

                                        Patient discharged. 

 

           Outpatient Attender: GEORGE MADSEN NP            10/01/2020 11:

40:00 AM [facility]

 

           Outpatient Attender: GEORGE MADSEN NP            2020 11:

40:00 AM [facility]

 

                          Emergency                 Attender: TOM PETTIT FABRICE

ttender: CHRISTOPHER SYMENOW PAReferrer: 

NICOLE DEL REAL, MSN  EMERGENCY ROOM-ER         2020 08:30:00 PM EDT -

 

2020 08:58:00 PM [facility]

 

                                        Patient discharged. 

 

                Outpatient      Attender: CHRISTOPHER SYMENOW PAConsultant: Alexae

 [facility]    

2020 07:55:00 PM [facility]

 

           Outpatient Attender: GEORGE MADSEN NP            2020 11:

40:00 AM [facility]

 

           Outpatient Attender: GEORGE MIKIRLAIS NP            2020 11:

40:00 AM [facility]

 

           Outpatient Attender: GEORGE MIKIRLAIS NP            2020 01:

00:00 PM [facility]

 

           Outpatient            [facility] 2020 12:00:00 

AM [facility]            eCW1 ([facility])

 

           Outpatient Attender: THOMAS MORA PA-C            2020 11:22:00

 AM [facility]

 

           Outpatient Attender: GEORGE MIKIRLAIS NP            2020 11:

00:00 AM [facility]

 

           Outpatient Attender: GEORGE NIKOLAIJARLAIS NP            2020 11:

20:00 AM [facility]

 

           Outpatient Attender: Breana Limon [facility]   2020 07:35:42 PM

 [facility]

 

           Outpatient Attender: Breana Limon [facility]   2020 12:10:22 AM

 [facility]

 

           Outpatient Attender: GEORGE MIKIRLAIS NP            2020 01:

00:00 PM [facility]

 

           Outpatient Attender: GEORGE DESJARLAIS NP            2020 11:

00:00 AM [facility]

 

           Outpatient Attender: GEORGE DESJARLAIS NP            2020 02:

18:00 PM [facility]

 

           Outpatient Attender: GEORGE NIKOLAIJARLAIS NP            2020 11:

00:00 AM [facility]

 

           Outpatient Attender: GEORGE NIKOLAIJARLAIS NP            03/10/2020 10:

40:00 AM [facility]

 

           Outpatient Attender: GEORGE NIKOLAIJARLAIS NP            2020 12:

59:00 PM [facility]

 

           Outpatient Attender: GEORGE MADSEN NP            2020 10:

34:00 AM [facility]

 

           Outpatient Attender: GEORGE MADSEN NP            2019 02:

00:00 PM [facility]

 

           Outpatient Attender: GEORGE MADSEN NP            11/15/2019 01:

59:00 PM [facility]

 

                Emergency       Attender: RUBY ROSADO                  12:17:00 PM EST - 2015 

12:30:00 PM [facility]

 

                Emergency       Attender: TACHO KHAN MD                 

015 04:15:00 PM [facility] 2015 

07:15:00 PM [facility]

 

                Inpatient       Attender: SCOTT Bellitter: JOS LARSON MD                 2014 

12:40:00 PM EST - 2014 01:22:00 AM [facility]

pital

 

                Emergency       Attender: CAPRICE KASPER MD                  06:14:00 PM [facility] 2013

08:39:00 PM [facility]

 

                Outpatient      Attender: Anahi Silva PAReferrer: Anahi ROSADO                 2013 

02:32:00 PM [facility]



                                                                                
                                                                                
                                                                                
                                                                                
                                                                                
                                                                                
  



Medications

          



          Medication Brand Name Start Date Product Form Dose      Route     Admi

nistrative 

Instructions Pharmacy Instructions Status     Indications Reaction   Description

 Data 

Source(s)

 

                atorvastatin 20 MG Oral Tablet ATORVASTATIN CALCIUM 10/09/2020 1

2:00:00 AM EDT 

tablet          90                              TAKE ONE TABLET BY MOUTH EVERY D

AY TAKE ONE TABLET BY MOUTH EVERY 

DAY          SOLD: 10/10/2020                                        [facility]

s

 

                atorvastatin 20 MG Oral Tablet ATORVASTATIN CALCIUM 10/09/2020 1

2:00:00 AM EDT 

tablet          90                              TAKE ONE TABLET BY MOUTH EVERY D

AY TAKE ONE TABLET BY MOUTH EVERY 

DAY          SOLD: 2021                                        [facility]

s

 

       physical therapy eval and tx - UNK    10/08/2020 12:00:00 AM EDT         

                           active  

                                        physical therapy eval and tx - eCW1 ([facility])

 

       physical therapy eval and tx - UNK    10/08/2020 12:00:00 AM EDT         

                           active  

                                        physical therapy eval and tx - eCW1 ([facility])

 

       physical therapy eval and tx - UNK    10/08/2020 12:00:00 AM EDT         

                           active  

                                        physical therapy eval and tx - eCW1 ([facility])

 

       physical therapy eval and tx - UNK    10/08/2020 12:00:00 AM EDT         

                           active  

                                        physical therapy eval and tx - eCW1 ([facility])

 

                    atorvastatin 20 MG Oral Tablet [Lipitor] Lipitor 20 MG Lipit

or 20 MG       10/08/2020 

12:00:00 AM EDT        1.0 {tablet}                      active               Li

pitor 20 MG eCW1 ([facility])

 

                    atorvastatin 20 MG Oral Tablet [Lipitor] Lipitor 20 MG Lipit

or 20 MG       10/08/2020 

12:00:00 AM EDT        1.0 {tablet}                      active               Li

pitor 20 MG eCW1 ([facility])

 

                          Tamsulosin hydrochloride 0.4 MG Oral Capsule [Flomax] 

Flomax 0.4 MG Flomax 0.4 

MG    10/08/2020 12:00:00 AM EDT       1.0 {capsule}                   active   

          Flomax 0.4 MG 

eCW1 ([facility])

 

       physical therapy eval and tx - UNK    10/08/2020 12:00:00 AM EDT         

                           active  

                                        physical therapy eval and tx - eCW1 ([facility])

 

                          Tamsulosin hydrochloride 0.4 MG Oral Capsule [Flomax] 

Flomax 0.4 MG Flomax 0.4 

MG    10/08/2020 12:00:00 AM EDT       1.0 {capsule}                   active   

          Flomax 0.4 MG 

eCW1 ([facility])

 

          100 mg              10/07/2020 12:00:00 AM EDT tablet    30           

       TAKE ONE TABLET BY MOUTH EVERY 

DAY        TAKE ONE TABLET BY MOUTH EVERY DAY SOLD: 12/10/2020                  

                [facility]

 

          100 mg              10/07/2020 12:00:00 AM EDT tablet    30           

       TAKE ONE TABLET BY MOUTH EVERY 

DAY        TAKE ONE TABLET BY MOUTH EVERY DAY SOLD: 10/08/2020                  

                [facility]

 

          100 mg              10/07/2020 12:00:00 AM EDT tablet    30           

       TAKE ONE TABLET BY MOUTH EVERY 

DAY        TAKE ONE TABLET BY MOUTH EVERY DAY SOLD: 2021                  

                [facility]

 

          100 mg              10/07/2020 12:00:00 AM EDT tablet    30           

       TAKE ONE TABLET BY MOUTH EVERY 

DAY        TAKE ONE TABLET BY MOUTH EVERY DAY SOLD: 2020                  

                [facility]

 

                quetiapine 100 MG Oral Tablet QUETIAPINE FUMARATE 10/05/2020 12:

00:00 AM EDT 

tablet          60                              TAKE TWO TABLETS BY MOUTH AT BED

TIME TAKE TWO TABLETS BY MOUTH AT 

BEDTIME      SOLD: 10/07/2020                                        [facility]

s

 

                quetiapine 100 MG Oral Tablet QUETIAPINE FUMARATE 10/05/2020 12:

00:00 AM EDT 

tablet          60                              TAKE TWO TABLETS BY MOUTH AT BED

TIME TAKE TWO TABLETS BY MOUTH AT 

BEDTIME      SOLD: 12/10/2020                                        [facility]

[facility]

 

                quetiapine 100 MG Oral Tablet QUETIAPINE FUMARATE 10/05/2020 12:

00:00 AM EDT 

tablet          60                              TAKE TWO TABLETS BY MOUTH AT BED

TIME TAKE TWO TABLETS BY MOUTH AT 

BEDTIME      SOLD: 2021                                        [facility]

[facility]

 

                quetiapine 100 MG Oral Tablet QUETIAPINE FUMARATE 10/05/2020 12:

00:00 AM EDT 

tablet          60                              TAKE TWO TABLETS BY MOUTH AT BED

TIME TAKE TWO TABLETS BY MOUTH AT 

BEDTIME      SOLD: 2020                                        [facility]

[facility]

 

          0.4 mg              10/04/2020 12:00:00 AM EDT capsule   14           

       TAKE ONE CAPSULE BY MOUTH EVERY

 DAY AS DIRECTED          TAKE ONE CAPSULE BY MOUTH EVERY DAY AS DIRECTED SOLD: 

10/04/2020                                                      [facility]

 

          500 mg              10/04/2020 12:00:00 AM EDT tablet    10           

       TAKE ONE TABLET BY MOUTH TWICE A

 DAY AS DIRECTED          TAKE ONE TABLET BY MOUTH TWICE A DAY AS DIRECTED SOLD:

 

10/04/2020                                                      [facility]

 

          25 mg               10/02/2020 12:00:00 AM EDT tablet    180          

       TAKE TWO TABLETS BY MOUTH EVERY 

8 HOURS    TAKE TWO TABLETS BY MOUTH EVERY 8 HOURS SOLD: 2021             

                     [facility]

 

          10 mg               10/02/2020 12:00:00 AM EDT tablet, sublingual 60  

                PLACE 1 TABLET UNDER 

THE TONGUE TWICE A DAY    PLACE 1 TABLET UNDER THE TONGUE TWICE A DAY SOLD: 

10/04/2020                                                      [facility]

 

          25 mg               10/02/2020 12:00:00 AM EDT tablet    180          

       TAKE TWO TABLETS BY MOUTH EVERY 

8 HOURS    TAKE TWO TABLETS BY MOUTH EVERY 8 HOURS SOLD: 2020             

                     [facility]

 

          25 mg               10/02/2020 12:00:00 AM EDT tablet    180          

       TAKE TWO TABLETS BY MOUTH EVERY 

8 HOURS    TAKE TWO TABLETS BY MOUTH EVERY 8 HOURS SOLD: 2020             

                     [facility]

 

          20 mg               10/02/2020 12:00:00 AM EDT tablet    90           

       TAKE ONE TABLET BY MOUTH THREE 

TIMES A DAY TAKE ONE TABLET BY MOUTH THREE TIMES A DAY SOLD: 2020         

                         

[facility]

 

          300 mg              10/02/2020 12:00:00 AM EDT tablet    30           

       TAKE ONE TABLET BY MOUTH EVERY 

EVENING    TAKE ONE TABLET BY MOUTH EVERY EVENING SOLD: 10/04/2020              

                    [facility]

 

          10 mg               10/02/2020 12:00:00 AM EDT tablet, sublingual 60  

                PLACE 1 TABLET UNDER 

THE TONGUE TWICE A DAY    PLACE 1 TABLET UNDER THE TONGUE TWICE A DAY SOLD: 

2020                                                      [facility]

 

          10 mg               10/02/2020 12:00:00 AM EDT tablet, sublingual 60  

                PLACE 1 TABLET UNDER 

THE TONGUE TWICE A DAY    PLACE 1 TABLET UNDER THE TONGUE TWICE A DAY SOLD: 

2020                                                      [facility]

 

          25 mg               10/02/2020 12:00:00 AM EDT tablet    180          

       TAKE TWO TABLETS BY MOUTH EVERY 

8 HOURS    TAKE TWO TABLETS BY MOUTH EVERY 8 HOURS SOLD: 10/04/2020             

                     [facility]

 

          10 mg               10/02/2020 12:00:00 AM EDT tablet, sublingual 60  

                PLACE 1 TABLET UNDER 

THE TONGUE TWICE A DAY    PLACE 1 TABLET UNDER THE TONGUE TWICE A DAY SOLD: 

2021                                                      [facility]

 

          300 mg              10/02/2020 12:00:00 AM EDT tablet    30           

       TAKE ONE TABLET BY MOUTH EVERY 

EVENING    TAKE ONE TABLET BY MOUTH EVERY EVENING SOLD: 2020              

                    [facility]

 

          20 mg               10/02/2020 12:00:00 AM EDT tablet    90           

       TAKE ONE TABLET BY MOUTH THREE 

TIMES A DAY TAKE ONE TABLET BY MOUTH THREE TIMES A DAY SOLD: 2020         

                         

[facility]

 

          300 mg              10/02/2020 12:00:00 AM EDT tablet    30           

       TAKE ONE TABLET BY MOUTH EVERY 

EVENING    TAKE ONE TABLET BY MOUTH EVERY EVENING SOLD: 2021              

                    [facility]

 

          20 mg               10/02/2020 12:00:00 AM EDT tablet    90           

       TAKE ONE TABLET BY MOUTH THREE 

TIMES A DAY TAKE ONE TABLET BY MOUTH THREE TIMES A DAY SOLD: 2021         

                         

[facility]

 

          20 mg               10/02/2020 12:00:00 AM EDT tablet    90           

       TAKE ONE TABLET BY MOUTH THREE 

TIMES A DAY TAKE ONE TABLET BY MOUTH THREE TIMES A DAY SOLD: 10/04/2020         

                         

[facility]

 

          300 mg              10/02/2020 12:00:00 AM EDT tablet    30           

       TAKE ONE TABLET BY MOUTH EVERY 

EVENING    TAKE ONE TABLET BY MOUTH EVERY EVENING SOLD: 2020              

                    [facility]

 

          100 mg              2020 12:00:00 AM EDT tablet    6            

       TAKE ONE TABLET BY MOUTH THREE 

TIMES A DAY TAKE ONE TABLET BY MOUTH THREE TIMES A DAY SOLD: 2020         

                         

[facility]

 

          300 mg              2020 12:00:00 AM EDT capsule   20           

       TAKE ONE CAPSULE BY MOUTH TWICE

 A DAY     TAKE ONE CAPSULE BY MOUTH TWICE A DAY SOLD: 2020               

                   [facility]

 

                    pantoprazole 40 MG Delayed Release Oral Tablet PANTOPRAZOLE 

SODIUM 2020 

12:00:00 AM EDT tablet,delayed release (DR/EC) 30                              T

RONNY ONE TABLET BY MOUTH 

EVERY DAY  TAKE ONE TABLET BY MOUTH EVERY DAY SOLD: 2021                  

                [facility]

 

          40 mg               2020 12:00:00 AM EDT tablet,delayed release 

(DR/EC) 30                  TAKE ONE 

TABLET BY MOUTH EVERY DAY TAKE ONE TABLET BY MOUTH EVERY DAY SOLD: 2020   

              

                                                    [facility]

 

                    pantoprazole 40 MG Delayed Release Oral Tablet PANTOPRAZOLE 

SODIUM 2020 

12:00:00 AM EDT tablet,delayed release (DR/EC) 30                              T

RONNY ONE TABLET BY MOUTH 

EVERY DAY  TAKE ONE TABLET BY MOUTH EVERY DAY SOLD: 12/10/2020                  

                [facility]

 

                    pantoprazole 40 MG Delayed Release Oral Tablet PANTOPRAZOLE 

SODIUM 2020 

12:00:00 AM EDT tablet,delayed release (DR/EC) 30                              T

RONNY ONE TABLET BY MOUTH 

EVERY DAY  TAKE ONE TABLET BY MOUTH EVERY DAY SOLD: 2020                  

                [facility]

 

                    pantoprazole 40 MG Delayed Release Oral Tablet PANTOPRAZOLE 

SODIUM 2020 

12:00:00 AM EDT tablet,delayed release (DR/EC) 30                              T

RONNY ONE TABLET BY MOUTH 

EVERY DAY  TAKE ONE TABLET BY MOUTH EVERY DAY SOLD: 10/08/2020                  

                [facility]

 

          20 mg               2020 12:00:00 AM EDT tablet    60           

       TAKE ONE TABLET BY MOUTH TWICE A 

DAY        TAKE ONE TABLET BY MOUTH TWICE A DAY SOLD: 2020                

                  [facility]

 

                          Propranolol Hydrochloride 20 MG Oral Tablet Propranolo

l HCl 20 MG Propranolol 

HCl 20 MG 2020 12:00:00 AM EDT        1.0 {tablet}                      ac

tive               Propranolol

 HCl 20 MG                              eCW1 ([facility]

laz)

 

                          Propranolol Hydrochloride 20 MG Oral Tablet Propranolo

l HCl 20 MG Propranolol 

HCl 20 MG 2020 12:00:00 AM EDT        1.0 {tablet}                      ac

tive               Propranolol

 HCl 20 MG                              eCW1 ([facility]

laz)

 

                          Propranolol Hydrochloride 20 MG Oral Tablet Propranolo

l HCl 20 MG Propranolol 

HCl 20 MG 2020 12:00:00 AM EDT        1.0 {tablet}                      ac

tive               Propranolol

 HCl 20 MG                              eCW1 ([facility]

laz)

 

          5 mg                2020 12:00:00 AM EDT tablet    60           

       TAKE ONE TABLET BY MOUTH TWO TIMES

 A DAY AS NEEDED MAXIMUM DAILY DOSE = 2 TABLETS TAKE ONE TABLET BY MOUTH TWO 

TIMES A DAY AS NEEDED MAXIMUM DAILY DOSE = 2 TABLETS SOLD: 2020           

                             

[facility]

 

          100 mg              2020 12:00:00 AM EDT tablet    30           

       TAKE ONE TABLET BY MOUTH EVERY 

DAY        TAKE ONE TABLET BY MOUTH EVERY DAY SOLD: 2020                  

                [facility]

 

          100 mg              2020 12:00:00 AM EDT tablet    30           

       TAKE ONE TABLET BY MOUTH EVERY 

DAY        TAKE ONE TABLET BY MOUTH EVERY DAY SOLD: 2020                  

                [facility]

 

          100 mg              2020 12:00:00 AM EDT tablet    30           

       TAKE ONE TABLET BY MOUTH EVERY 

DAY        TAKE ONE TABLET BY MOUTH EVERY DAY SOLD: 2020                  

                [facility]

 

                quetiapine 100 MG Oral Tablet QUETIAPINE FUMARATE 2020 12:

00:00 AM EDT 

tablet          60                              TAKE TWO TABLETS BY MOUTH AT BED

TIME TAKE TWO TABLETS BY MOUTH AT 

BEDTIME      SOLD: 2020                                        [facility]

[facility]

 

                quetiapine 100 MG Oral Tablet QUETIAPINE FUMARATE 2020 12:

00:00 AM EDT 

tablet          60                              TAKE TWO TABLETS BY MOUTH AT BED

TIME TAKE TWO TABLETS BY MOUTH AT 

BEDTIME      SOLD: 2020                                        [facility]

[facility]

 

                quetiapine 100 MG Oral Tablet QUETIAPINE FUMARATE 2020 12:

00:00 AM EDT 

tablet          60                              TAKE TWO TABLETS BY MOUTH AT BED

TIME TAKE TWO TABLETS BY MOUTH AT 

BEDTIME      SOLD: 09/10/2020                                        [facility]

[facility]

 

                quetiapine 100 MG Oral Tablet QUETIAPINE FUMARATE 2020 12:

00:00 AM EDT 

tablet          60                              TAKE TWO TABLETS BY MOUTH AT BED

TIME TAKE TWO TABLETS BY MOUTH AT 

BEDTIME      SOLD: 2020                                        [facility]

[facility]

 

          10 mg               2020 12:00:00 AM EDT tablet, sublingual 60  

                PLACE 1 TABLET UNDER 

THE TONGUE TWICE A DAY    PLACE 1 TABLET UNDER THE TONGUE TWICE A DAY SOLD: 

2020                                                      [facility]

 

          10 mg               2020 12:00:00 AM EDT tablet, sublingual 60  

                PLACE 1 TABLET UNDER 

THE TONGUE TWICE A DAY    PLACE 1 TABLET UNDER THE TONGUE TWICE A DAY SOLD: 

2020                                                      [facility]

 

          10 mg               2020 12:00:00 AM EDT tablet, sublingual 60  

                PLACE 1 TABLET UNDER 

THE TONGUE TWICE A DAY    PLACE 1 TABLET UNDER THE TONGUE TWICE A DAY SOLD: 

2020                                                      [facility]

 

          10 mg               2020 12:00:00 AM EDT tablet, sublingual 60  

                PLACE 1 TABLET UNDER 

THE TONGUE TWICE A DAY    PLACE 1 TABLET UNDER THE TONGUE TWICE A DAY SOLD: 

2020                                                      [facility]

 

          300 mg              2020 12:00:00 AM EDT tablet    30           

       TAKE ONE TABLET BY MOUTH EVERY 

EVENING    TAKE ONE TABLET BY MOUTH EVERY EVENING SOLD: 2020              

                    [facility]

 

          300 mg              2020 12:00:00 AM EDT tablet    30           

       TAKE ONE TABLET BY MOUTH EVERY 

EVENING    TAKE ONE TABLET BY MOUTH EVERY EVENING SOLD: 2020              

                    [facility]

 

          300 mg              2020 12:00:00 AM EDT tablet    30           

       TAKE ONE TABLET BY MOUTH EVERY 

EVENING    TAKE ONE TABLET BY MOUTH EVERY EVENING SOLD: 2020              

                    [facility]

 

          300 mg              2020 12:00:00 AM EDT tablet    30           

       TAKE ONE TABLET BY MOUTH EVERY 

EVENING    TAKE ONE TABLET BY MOUTH EVERY EVENING SOLD: 2020              

                    [facility]

 

          5 mg                2020 12:00:00 AM EDT tablet    30           

       TAKE 1 TABLET BY MOUTH TWICE AS 

NEEDED MAXIMUM DAILY DOSE = 2 TABLETS   TAKE 1 TABLET BY MOUTH TWICE AS NEEDED 

MAXIMUM DAILY DOSE = 2 TABLETS SOLD: 2020                                 

       [facility]

 

          0.25 mg             2020 12:00:00 AM EDT tablet    30           

       TAKE 1 TABLET EVERY DAY AT 

BEDTIME MAXIMUM DAILY DOSE = 1 TABLET   TAKE 1 TABLET EVERY DAY AT BEDTIME MAXIM

UM

 DAILY DOSE = 1 TABLET SOLD: 2020                                        [facility]

[facility]

 

          40 mg               2020 12:00:00 AM EDT tablet,delayed release 

(DR/EC) 30                  TAKE ONE 

TABLET BY MOUTH EVERY DAY TAKE ONE TABLET BY MOUTH EVERY DAY SOLD: 2020   

              

                                                    [facility]

 

          40 mg               2020 12:00:00 AM EDT tablet,delayed release 

(DR/EC) 30                  TAKE ONE 

TABLET BY MOUTH EVERY DAY TAKE ONE TABLET BY MOUTH EVERY DAY SOLD: 2020   

              

                                                    [facility]

 

          40 mg               2020 12:00:00 AM EDT tablet,delayed release 

(DR/EC) 30                  TAKE ONE 

TABLET BY MOUTH EVERY DAY TAKE ONE TABLET BY MOUTH EVERY DAY SOLD: 2020   

              

                                                    [facility]

 

          40 mg               2020 12:00:00 AM EDT tablet,delayed release 

(DR/EC) 30                  TAKE ONE 

TABLET BY MOUTH EVERY DAY TAKE ONE TABLET BY MOUTH EVERY DAY SOLD: 2020   

              

                                                    [facility]

 

          40 mg               2020 12:00:00 AM EDT tablet,delayed release 

(DR/EC) 30                  TAKE ONE 

TABLET BY MOUTH EVERY DAY TAKE ONE TABLET BY MOUTH EVERY DAY SOLD: 2020   

              

                                                    [facility]

 

          100 mg              2020 12:00:00 AM EDT tablet    60           

       TAKE TWO TABLETS BY MOUTH ONCE 

DAILY      TAKE TWO TABLETS BY MOUTH ONCE DAILY SOLD: 2020                

                  [facility]

 

          100 mg              2020 12:00:00 AM EDT tablet    60           

       TAKE TWO TABLETS BY MOUTH ONCE 

DAILY      TAKE TWO TABLETS BY MOUTH ONCE DAILY SOLD: 2020                

                  [facility]

 

          100 mg              2020 12:00:00 AM EDT tablet    60           

       TAKE TWO TABLETS BY MOUTH ONCE 

DAILY      TAKE TWO TABLETS BY MOUTH ONCE DAILY SOLD: 2020                

                  [facility]

 

          100 mg              2020 12:00:00 AM EDT tablet    60           

       TAKE TWO TABLETS BY MOUTH ONCE 

DAILY      TAKE TWO TABLETS BY MOUTH ONCE DAILY SOLD: 2020                

                  [facility]

 

          0.25 mg             2020 12:00:00 AM EDT tablet    30           

       TAKE 1 TABLET BY MOUTH DAILY AT

 BEDTIME MAXIMUM DAILY DOSE = 1 TABLET  TAKE 1 TABLET BY MOUTH DAILY AT BEDTIME 

MAXIMUM DAILY DOSE = 1 TABLET SOLD: 2020                                  

      [facility]

 

          90 mcg/actuation           2020 12:00:00 AM EDT HFA aerosol inha

ler 18                  INHALE 

TWO PUFFS BY MOUTH EVERY 4 HOURS AS NEEDED INHALE TWO PUFFS BY MOUTH EVERY 4 

HOURS AS NEEDED SOLD: 2020                                        [facility]

 

          90 mcg/actuation           2020 12:00:00 AM EDT HFA aerosol inha

ler 18                  INHALE 

TWO PUFFS BY MOUTH EVERY 4 HOURS AS NEEDED INHALE TWO PUFFS BY MOUTH EVERY 4 

HOURS AS NEEDED SOLD: 2020                                        [facility]

 

          90 mcg/actuation           2020 12:00:00 AM EDT HFA aerosol inha

ler 18                  INHALE 

TWO PUFFS BY MOUTH EVERY 4 HOURS AS NEEDED INHALE TWO PUFFS BY MOUTH EVERY 4 

HOURS AS NEEDED SOLD: 2020                                        [facility]

 

          0.25 mg             2020 12:00:00 AM EDT tablet    15           

       TAKE ONE TABLET BY MOUTH DAILY 

AT BEDTIME FOR 15 DAYS MAXIMUM DAILY DOSE = 1 TABLET TAKE ONE TABLET BY MOUTH 

DAILY AT BEDTIME FOR 15 DAYS MAXIMUM DAILY DOSE = 1 TABLET SOLD: 2020     

                            

                                        [facility]

 

                    Triazolam 0.25 MG Oral Tablet [Halcion] Halcion 0.25 MG Halc

ion 0.25 MG     

03/10/2020 12:00:00 AM EDT        1.0 {tablet}                      active      

         Halcion 0.25 MG eCW1 

([facility])

 

                    Triazolam 0.25 MG Oral Tablet [Halcion] Halcion 0.25 MG Halc

ion 0.25 MG     

03/10/2020 12:00:00 AM EDT        1.0 {tablet}                      active      

         Halcion 0.25 MG eCW1 

([facility])

 

                    Triazolam 0.25 MG Oral Tablet [Halcion] Halcion 0.25 MG Halc

ion 0.25 MG     

03/10/2020 12:00:00 AM EDT        1.0 {tablet}                      active      

         Halcion 0.25 MG eCW1 

([facility])

 

          40 mg               2020 12:00:00 AM EST tablet,delayed release 

(DR/EC) 30                  TAKE 1 

TABLET BY MOUTH ONCE A DAY TAKE 1 TABLET BY MOUTH ONCE A DAY SOLD: 2020   

              

                                                    [facility]

 

          40 mg               2020 12:00:00 AM EST tablet,delayed release 

(DR/EC) 30                  TAKE 1 

TABLET BY MOUTH ONCE A DAY TAKE 1 TABLET BY MOUTH ONCE A DAY SOLD: 2020   

              

                                                    [facility]

 

          10 mg               2020 12:00:00 AM EST tablet, sublingual 60  

                PLACE ONE TABLET 

UNDER THE TONGUE AND ALLOW TO DISSOLVE TWICE A DAY PLACE ONE TABLET UNDER THE 

TONGUE AND ALLOW TO DISSOLVE TWICE A DAY SOLD: 2020                       

                 [facility]

 

          25 mg               2020 12:00:00 AM EST tablet    180          

       TAKE TWO TABLETS BY MOUTH EVERY 

8 HOURS    TAKE TWO TABLETS BY MOUTH EVERY 8 HOURS SOLD: 2020             

                     [facility]

 

                quetiapine 100 MG Oral Tablet QUETIAPINE FUMARATE 2020 12:

00:00 AM EST 

tablet          60                              TAKE TWO TABLETS BY MOUTH DAILY 

AT BEDTIME TAKE TWO TABLETS BY MOUTH

 DAILY AT BEDTIME SOLD: 2020                                        [facility]

 

                quetiapine 100 MG Oral Tablet QUETIAPINE FUMARATE 2020 12:

00:00 AM EST 

tablet          60                              TAKE TWO TABLETS BY MOUTH DAILY 

AT BEDTIME TAKE TWO TABLETS BY MOUTH

 DAILY AT BEDTIME SOLD: 2020                                        [facility]

 

                quetiapine 100 MG Oral Tablet QUETIAPINE FUMARATE 2020 12:

00:00 AM EST 

tablet          60                              TAKE TWO TABLETS BY MOUTH DAILY 

AT BEDTIME TAKE TWO TABLETS BY MOUTH

 DAILY AT BEDTIME SOLD: 2020                                        [facility]

 

          100 mg              2020 12:00:00 AM EST tablet    60           

       TAKE TWO TABLETS BY MOUTH ONCE 

DAILY      TAKE TWO TABLETS BY MOUTH ONCE DAILY SOLD: 2020                

                  [facility]

 

          10 mg               2020 12:00:00 AM EST tablet, sublingual 60  

                PLACE ONE TABLET 

UNDER THE TONGUE AND ALLOW TO DISSOLVE TWICE A DAY PLACE ONE TABLET UNDER THE 

TONGUE AND ALLOW TO DISSOLVE TWICE A DAY SOLD: 2020                       

                 [facility]

 

          10 mg               2020 12:00:00 AM EST tablet, sublingual 60  

                PLACE ONE TABLET 

UNDER THE TONGUE AND ALLOW TO DISSOLVE TWICE A DAY PLACE ONE TABLET UNDER THE 

TONGUE AND ALLOW TO DISSOLVE TWICE A DAY SOLD: 2020                       

                 [facility]

 

          25 mg               2020 12:00:00 AM EST tablet    180          

       TAKE TWO TABLETS BY MOUTH EVERY 

8 HOURS    TAKE TWO TABLETS BY MOUTH EVERY 8 HOURS SOLD: 2020             

                     [facility]

 

          100 mg              2020 12:00:00 AM EST tablet    60           

       TAKE TWO TABLETS BY MOUTH ONCE 

DAILY      TAKE TWO TABLETS BY MOUTH ONCE DAILY SOLD: 2020                

                  [facility]

 

                quetiapine 100 MG Oral Tablet QUETIAPINE FUMARATE 2020 12:

00:00 AM EST 

tablet          60                              TAKE TWO TABLETS BY MOUTH DAILY 

AT BEDTIME TAKE TWO TABLETS BY MOUTH

 DAILY AT BEDTIME SOLD: 2020                                        [facility]

 

                          Hydroxyzine Hydrochloride 25 MG Oral Tablet HydrOXYzin

e HCl 25 MG HydrOXYzine 

HCl 25 MG 2020 12:00:00 AM EST                               active       

      HydrOXYzine HCl 25 MG 

eCW1 ([facility])

 

                          Hydroxyzine Hydrochloride 25 MG Oral Tablet HydrOXYzin

e HCl 25 MG HydrOXYzine 

HCl 25 MG 2020 12:00:00 AM EST                               active       

      HydrOXYzine HCl 25 MG 

eCW1 ([facility])

 

                          Hydroxyzine Hydrochloride 25 MG Oral Tablet HydrOXYzin

e HCl 25 MG HydrOXYzine 

HCl 25 MG 2020 12:00:00 AM EST                               active       

      HydrOXYzine HCl 25 MG 

eCW1 ([facility])

 

          300 mg              2020 12:00:00 AM EST tablet    30           

       TAKE ONE TABLET BY MOUTH EVERY 

DAY IN THE EVENING        TAKE ONE TABLET BY MOUTH EVERY DAY IN THE EVENING SOLD

: 

2020                                                      [facility]

 

          300 mg              2020 12:00:00 AM EST tablet    30           

       TAKE ONE TABLET BY MOUTH EVERY 

DAY IN THE EVENING        TAKE ONE TABLET BY MOUTH EVERY DAY IN THE EVENING SOLD

: 

2020                                                      [facility]

 

          300 mg              2020 12:00:00 AM EST tablet    30           

       TAKE ONE TABLET BY MOUTH EVERY 

DAY IN THE EVENING        TAKE ONE TABLET BY MOUTH EVERY DAY IN THE EVENING SOLD

: 

2020                                                      [facility]

 

          300 mg              2020 12:00:00 AM EST tablet    30           

       TAKE ONE TABLET BY MOUTH EVERY 

DAY IN THE EVENING        TAKE ONE TABLET BY MOUTH EVERY DAY IN THE EVENING SOLD

: 

2020                                                      [facility]

 

          40 mg               2019 12:00:00 AM EST tablet,delayed release 

(DR/EC) 30                  TAKE 1 

TABLET BY MOUTH ONCE A DAY TAKE 1 TABLET BY MOUTH ONCE A DAY SOLD: 2019   

              

                                                    [facility]

 

          40 mg               2019 12:00:00 AM EST tablet,delayed release 

(DR/EC) 30                  TAKE 1 

TABLET BY MOUTH ONCE A DAY TAKE 1 TABLET BY MOUTH ONCE A DAY SOLD: 2020   

              

                                                    [facility]

 

          90 mcg/actuation           2019 12:00:00 AM EST HFA aerosol inha

ler 18                  INHALE 

TWO PUFFS BY MOUTH EVERY 4 HOURS AS NEEDED INHALE TWO PUFFS BY MOUTH EVERY 4 

HOURS AS NEEDED SOLD: 2020                                        [facility]

 

          90 mcg/actuation           2019 12:00:00 AM EST HFA aerosol inha

ler 18                  INHALE 

TWO PUFFS BY MOUTH EVERY 4 HOURS AS NEEDED INHALE TWO PUFFS BY MOUTH EVERY 4 

HOURS AS NEEDED SOLD: 2020                                        [facility]

 

          90 mcg/actuation           2019 12:00:00 AM EST HFA aerosol inha

ler 18                  INHALE 

TWO PUFFS BY MOUTH EVERY 4 HOURS AS NEEDED INHALE TWO PUFFS BY MOUTH EVERY 4 

HOURS AS NEEDED SOLD: 2020                                        [facility]

 

          90 mcg/actuation           2019 12:00:00 AM EST HFA aerosol inha

ler 18                  INHALE 

TWO PUFFS BY MOUTH EVERY 4 HOURS AS NEEDED INHALE TWO PUFFS BY MOUTH EVERY 4 

HOURS AS NEEDED SOLD: 2019                                        [facility]

 

          90 mcg/actuation           2019 12:00:00 AM EST HFA aerosol inha

ler 18                  INHALE 

TWO PUFFS BY MOUTH EVERY 4 HOURS AS NEEDED INHALE TWO PUFFS BY MOUTH EVERY 4 

HOURS AS NEEDED SOLD: 2020                                        [facility]

 

          90 mcg/actuation           2019 12:00:00 AM EST HFA aerosol inha

ler 18                  INHALE 

TWO PUFFS BY MOUTH EVERY 4 HOURS AS NEEDED INHALE TWO PUFFS BY MOUTH EVERY 4 

HOURS AS NEEDED SOLD: 2020                                        [facility]

 

          100 mg              11/10/2019 12:00:00 AM EST tablet    30           

       TAKE ONE TABLET BY MOUTH EVERY 

DAY        TAKE ONE TABLET BY MOUTH EVERY DAY SOLD: 2020                  

                [facility]

 

                quetiapine 100 MG Oral Tablet QUETIAPINE FUMARATE 11/10/2019 12:

00:00 AM EST 

tablet          60                              TAKE TWO TABLETS BY MOUTH AT BED

TIME TAKE TWO TABLETS BY MOUTH AT 

BEDTIME      SOLD: 2019                                        [facility]

[facility]

 

                quetiapine 100 MG Oral Tablet QUETIAPINE FUMARATE 11/10/2019 12:

00:00 AM EST 

tablet          60                              TAKE TWO TABLETS BY MOUTH AT BED

TIME TAKE TWO TABLETS BY MOUTH AT 

BEDTIME      SOLD: 2020                                        [facility]

[facility]

 

          100 mg              11/10/2019 12:00:00 AM EST tablet    30           

       TAKE ONE TABLET BY MOUTH EVERY 

DAY        TAKE ONE TABLET BY MOUTH EVERY DAY SOLD: 2019                  

                [facility]

 

          10 mg               10/16/2019 12:00:00 AM EDT tablet, sublingual 60  

                PLACE ONE TABLET 

UNDER THE TONGUE AND DISSOLVE TWICE A DAY PLACE ONE TABLET UNDER THE TONGUE AND 

DISSOLVE TWICE A DAY SOLD: 2019                                        [facility]

 

          10 mg               10/16/2019 12:00:00 AM EDT tablet, sublingual 60  

                PLACE ONE TABLET 

UNDER THE TONGUE AND DISSOLVE TWICE A DAY PLACE ONE TABLET UNDER THE TONGUE AND 

DISSOLVE TWICE A DAY SOLD: 2020                                        [facility]

 

          300 mg              2019 12:00:00 AM EDT tablet    30           

       TAKE ONE TABLET BY MOUTH DAILY 

EVERY EVENING TAKE ONE TABLET BY MOUTH DAILY EVERY EVENING SOLD: 2019     

                      

                                        [facility]



                                                                                
                                                                                
                                                                                
                                                                                
                                                                                
                                                                                
                                                                                
                                                                                
                                                                                
                                                                                
                                                                                
                                                                                
                                                                                
                                                                                
                                                                                
                                      



Insurance Providers

          



             Payer name   Policy type / Coverage type Policy ID    Covered party

 ID Covered 

party's relationship to borrego Policy Bor[employer]             Plan Information

 

          [employer] [facility]           [id number]           SP           

       [id number]

 

          [facility]            [id number]           S                   [id number]

 

          [facility] MEDICAID           [id number]           S            

       [id number]

 

          [facility] MEDICAID           [id number]           S            

       [id number]

 

          [facility] MEDICAID           [id number]           S            

       [id number]

 

          [facility] [address]           [id number]           S     

              [id number]

 

          MEDICAID            [id number]            S                   [id number]

 

          Medicaid  P         [id number]            S                   [id number]

 

          [facility]            [id number]           S                   [id number]

 

          [facility] MEDICAID           [id number]           S            

       [id number]

 

                    [facility] [id number]          

                     

[id number]

 

                    [facility]-Med[facility] [id number]          

                     

[id number]

 

                    [employer] [id number]        

                       

[id number]

 

                    [facility]-Medicaid [id number]          

                     

[id number]

 

          [employer] FB            [id number]           S                   [id number]

 

                    [employer] [id number]          

                     

[id number]

 

                    [employer] [id number]        

                       

[id number]

 

          [employer] MEDICA[employer]           [id number]           S            

       [id number]

 

          [employer] FB            [id number]           S                   [id number]

 

                    [employer] [id number]        

                       

[id number]

 

                    [employer]-Medicaid [id number]          

                     

[id number]

 

          [employer] MEDICA[employer] HMO   [id number]           S            

       [id number]

 

          [employer] [facility]           [id number]           SP           

       [id number]

 

          [facility]   [id number]           S                   [id number]

 

          [facility] O         [id number]           C              

     [id number]

 

          [employer] MEDICA[employer] MCD HMO   [id number]           S            

       [id number]

 

          BLUE CROSS CASTRO PLAN           [id number]           SP            

      [id number]

 

          [employer] [place]        [id number]           S            

       [id number]

 

          [employer]BS HMO BLUE BC        [id number]           S                   [id number]

[id number]

 

          BLUE CROSS CASTRO PLAN           [id number]            SP               

   [id number]

 

          [facility] P         UNAVAILABLE           C             

      UNAVAILABLE

 

          SELF PAY            UNAVAILABLE                               UNAVAILA

BLE

 

                                                                       



                                                                                
                                                                                
                                                                                
                                                                                
                                                                                
        



Problems, Conditions, and Diagnoses

          



           Code       Display Name Description Problem Type Effective Dates Data

 Source(s)

 

                    N40.0               [id number]           Benign prostatic hyp

erplasia, unspecified whether lower urinary 

tract symptoms present Problem             10/08/2020 12:00:00 AM EDT eCW1 ([facility])

 

                    E66.09              [id number]           Class 2 obesity due 

to excess calories without serious 

comorbidity in adult, unspecified BMI Problem             10/08/2020 12:00:00 AM

 EDT eCW1 

([facility])

 

             M54.31       [id number] Sciatica, right side Problem      10

/2020 12:00:00 AM EDT

                                        eCW1 ([facility]

laz)

 

           M54.32     [id number]   Sciatica, left side Problem    10/08/2020 12:00

:00 AM EDT eCW1 

([facility])

 

                F43.23          [id number]       Adjustment disorder with mixed a

nxiety and depressed mood 

Problem                   2020 12:00:00 AM EDT eCW1 ([facility])

 

                    F43.23              Adjustment disorder with mixed anxiety a

nd depressed mood ADJUSTMENT 

DISORDER WITH MIXED ANXIETY AND DEPRESS Diagnosis           2020 01:20:00 

PM [facility]

 

                    F43.10              Post-traumatic stress disorder, unspecif

ied POST-TRAUMATIC STRESS 

DISORDER, UNSPECIFIED Diagnosis           2020 01:20:00 PM [facility]

sarthak

 

                    F41.0               Panic disorder [episodic paroxysmal anxi

ety] PANIC DISORDER [EPISODIC 

PAROXYSMAL ANXIETY] Diagnosis           2020 01[facility]20:00 [facility]

l

 

             F60.3        Borderline personality disorder BORDERLINE PERSONALITY

 DISORDER Diagnosis    

2020 09:22:00 AM [facility]

 

                F41.8           Other specified anxiety disorders OTHER SPECIFIE

D ANXIETY DISORDERS 

Diagnosis                 2020 11:40:00 AM [facility]

 

                    Z90.49              Acquired absence of other specified part

s of digestive tract ACQUIRED 

ABSENCE OF OTHER SPECIFIED PARTS OF DIGES Diagnosis           10/27/2020 07:34:0

0 AM [facility]

 

                    Z79.899             Other long term (current) drug therapy O

THER LONG TERM (CURRENT) DRUG 

THERAPY             Diagnosis           10/27/2020 07:34:00 AM [facility]

l

 

             R35.0        Frequency of micturition FREQUENCY OF MICTURITION Diag

nosis    10/27/2020 

07:34:00 AM [facility]

 

             M54.32       Sciatica, left side SCIATICA, LEFT SIDE Diagnosis    1

 02:37:00 PM 

[facility]

 

                    M51.34              Other intervertebral disc degeneration, 

thoracic region OTHER 

INTERVERTEBRAL DISC DEGENERATION, THORACIC R Diagnosis                 10/12/202

0 02:06:00 PM 

[facility]

 

             M54.31       Sciatica, right side SCIATICA, RIGHT SIDE Diagnosis   

 10/12/2020 02:06:00 

PM [facility]

 

             M25.559      Pain in unspecified hip PAIN IN UNSPECIFIED HIP Diagno

sis    10/12/2020 

02:06:00 PM [facility]

 

                    E66.09              Other obesity due to excess calories OTH

ER OBESITY DUE TO EXCESS CALORIES

                    Diagnosis           10/08/2020 12:43:00 PM [facility]

 

                    N40.0               Benign prostatic hyperplasia without low

er urinary tract symptoms BENIGN 

PROSTATIC HYPERPLASIA WITHOUT LOWER URINRY Diagnosis           10/08/2020 12:43:

00 PM [facility]

 

           M25.552    Pain in left hip PAIN IN LEFT HIP Diagnosis  10/08/2020 12

:43:00 PM [facility]

 

             M25.551      Pain in right hip PAIN IN RIGHT HIP Diagnosis    10/08

/2020 12:43:00 PM [facility]

 

                    Z13.220             Encounter for screening for lipoid disor

ders ENCOUNTER FOR SCREENING FOR

LIPOID DISOR        Diagnosis           10/08/2020 12:43:00 PM [facility]

 

                N30.00          Acute cystitis without hematuria ACUTE CYSTITIS 

WITHOUT HEMATURIA 

Diagnosis                 10/08/2020 12:43:00 PM [facility]

 

                    Z87.440             Personal history of urinary (tract) infe

ctions PERSONAL HISTORY OF 

URINARY (TRACT) INFECTIONS Diagnosis           10/04/2020 05:42:00 AM [facility]

 

                    N40.1               Benign prostatic hyperplasia with lower 

urinary tract symptoms BENIGN 

PROSTATIC HYPERPLASIA WITH LOWER URINARY TR Diagnosis                 10/04/2020

 05:42:00 AM [facility]

 

             F41.9        Anxiety disorder, unspecified ANXIETY DISORDER, UNSPEC

IFIED Diagnosis    

2020 11:40:00 AM [facility]

 

           R30.0      Dysuria    DYSURIA    Diagnosis  2020 08:30:00 PM [facility]

 

                          Z53.21                    Procedure and treatment not 

carried out due to patient leaving prior to 

being seen by health care provider      PROC/TRTMT NOT CRD OUT D/T PT LV BEF SEE

N BY 

[facility] CARE PROV      Diagnosis           2020 11:22:00 AM [facility]

 

             R63.4        Abnormal weight loss ABNORMAL WEIGHT LOSS Diagnosis   

 2020 02:18:00 PM

[facility]



                                                                                
                                                                                
                                                                                
                                                                                
                                            



Results

          



                    ID                  Date                Data Source

 

                    [id number]       10/27/2020 09:18:00 AM [facility]

 

                                          Patient: ANITHA SILVER Observation

 Report - Physicians/Mid Levels MRN: 

[id number].VisitID: [id number] [place] [address] [phone], MRegistration Date/Time: 10/27/2020 06:49 Weight:113.3 kg
(S). Height/Length:72 inches (S). BMI:33.9        FAMILY HISTORYNo significant 
family medical history.        (Electronically signed by RICCARDO Hughes 
10/27/2020 08:50)   









          Name      Value     Range     Interpretation Code Description Data Janice

rce(s) Supporting 

Document(s)

 

                                                                       









                    ID                  Date                Data Source

 

                    [id number] 10/27/2020 08:41:00 AM EDT [facility]









          Name      Value     Range     Interpretation Code Description Data Janice

rce(s) Supporting 

Document(s)

 

          PSA SCREENING 3.3 ng/mL 0.0-4.0                       [facility]  

 

                                        THIS ASSAY WAS PERFORMED ON THE SIEMENS DIMENSION EXL USINGTHE B-

GALACTOSIDASE/CRPG METHODOLOGY. THE PSA IS NOT AN ABSOLUTE TEST FOR MALIGNANCY. 
IT SHOULD BEUSED IN CONJUNCTION WITH INFORMATION AVAILABLE FROM THECLINICAL 
EVALUATION AND OTHER DIAGNOSTIC PROCEDURES. VALUES OBTAINED WITH DIFFERENT ASSAY
METHODS CANNOT BE USEDINTERCHANGEABLY. 









                    ID                  Date                Data Source

 

                    [id number]    10/27/2020 08:25:00 AM [facility]









          Name      Value     Range     Interpretation Code Description Data [facility]

rce(s) Supporting 

Document(s)

 

          GLUCOSE   109 mg/dL     H                   [facility]  

 

          BLOOD UREA NITROGEN 15 mg/dL  7-18                          [facility]

ital  

 

          CREATININE 1.3 mg/dL 0.7-1.3                       [facility]  

 

          SODIUM    138 mmol/L 136-145                       [facility]  

 

          POTASSIUM 4.4 mmol/L 3.5-5.1                       [facility]  

 

          CHLORIDE  102 mmol/L                         [facility]  

 

          CO2       31 mmol/L 21-32                         [facility]  

 

          CALCIUM   9.1 mg/dL 8.5-10.1                      [facility]  

 

          ANION GAP 5.0 mmol/L 5-12                          [facility]  

 

          GLOMERULAR FILTRATION RATE 59 mL/min                               [facility]  

 

                                        GFR IS CALCULATED IN mL/min/1.73m2 YONATHAN

L FUNCTION:                          

>90MILDLY DECREASED:                        60-89MILDY TO MODERATELY DECREASED: 
         45-59        MODERATELY TO SEVERELY DECREASED:        30-44SEVERELY 
DECREASED:                      15-29RENAL FAILURE:                            
<15 









                    ID                  Date                Data Source

 

                    [id number]   10/27/2020 08:08:00 AM EDT [facility]

l









          Name      Value     Range     Interpretation Code Description Data Janice

rce(s) Supporting 

Document(s)

 

          WHITE BLOOD COUNT 7.5 K/mm3 4.0-10.0                      [facility]

[facility]  

 

          RED BLOOD COUNT 5.52 M/mm3 4.50-6.00                     [facility]  

 

          HEMOGLOBIN 16.2 gm/dL 14.0-18.0                     [facility]  

 

          HEMATOCRIT 48.5 %    42.0-54.0                     [facility]  

 

          MEAN CELL VOLUME 87.9 fl   80-96                         [facility]  

 

          MEAN CORPUSCULAR HEMOGLOBIN 29.3 pg   27.0-31.0                     [facility]  

 

          MEAN CORPUSCULAR HGB CONC 33.4 g/dl 32.0-36.0                     [facility]  

 

          RED CELL DISTRIBUTION WIDTH 13.1 %    10.0-14.5                     [facility]  

 

          PLATELET COUNT 200 K/mm3 172-450                       [facility] 

 

 

          MEAN PLATELET VOLUME 11.1 fl   9.0-13.0                      [facility]

pital  

 

          GRAN %    55.2 %    50-80.0                       [facility]  

 

          IG%       0.1 %     0.0-0.2                       [facility]  

 

          LYMPH %   33.1 %    25.0-50.0                     [facility]  

 

          MONO %    8.2 %     2.0-10.0                      [facility]  

 

          EOS %     2.9 %     0-5.0                         [facility]  

 

          BASO %    0.5 %     0.0-2.0                       [facility]  

 

          GRAN #    4.1 K/mm3 2.0-8.00                      [facility]  

 

          IG#       0.0 K/mm3 0.0-0.2                       [facility]  

 

          LYMPH #   2.5 K/mm3 1.0-5.0                       [facility]  

 

          MONO #    0.6 K/mm3 0.10-1.20                     [facility]  

 

          EOS #     0.2 K/mm3 0.0-0.5                       [facility]  

 

          BASO #    0.0 K/mm3 0.0-0.2                       [facility]  









                    ID                  Date                Data Source

 

                    [id number]   10/27/2020 07:29:00 AM EDT [facility]

l

 

                                        TSYSORDER [id number] 









          Name      Value     Range     Interpretation Code Description Data Janice

rce(s) Supporting 

Document(s)

 

          URINE RBC 3-5 /hpf  0-3       H                   [facility]  

 

          URINE WBC 1-3 /hpf  0-5                           [facility]  

 

          URINE EPITHELIAL CELLS NONE SEEN /hpf 0                             [facility]  

 

          URINE BACTERIA TRACE     NONE SEEN H                   [facility] 

 

 

          URINE MUCUS 2+        NEGATIVE  [facility]  









                    ID                  Date                Data Source

 

                    [id number] 10/27/2020 07:23:00 AM EDT [facility]

ital

 

                                        TSYSORDER [id number] 









          Name      Value     Range     Interpretation Code Description Data Janice

rce(s) Supporting 

Document(s)

 

          URINE COLOR. [facility]  

 

          URINE APPEARANCE CLEAR                                   [facility]

l  

 

          URINE GLUCOSE (UA) NEGATIVE mg/dL NEGATIVE                      [facility]  

 

          URINE BILIRUBIN NEGATIVE  NEGATIVE                      [facility]

  

 

          URINE KETONE NEGATIVE mg/dL NEGATIVE                      [facility]

[facility]  

 

          SPECIFIC GRAVITY,URINE >1.030    1.001-1.035                     [facility]  

 

          URINE BLOOD TRACE     NEGATIVE  [facility]  

 

                                        10/27/20 0723:  BLOOD previously reporte

d as: TRACE   

 

          PH,URINE  6.0       5.0-9.0                       [facility]  

 

          URINE PROTEIN NEGATIVE mg/dL NEGATIVE                      [facility]

[facility]  

 

          URINE UROBILINOGEN NORMAL(0.2-1) mg/dL 0-1                           R

[facility]  

 

          URINE NITRATE NEGATIVE  NEGATIVE                      [facility]  

 

          URINE LEUKOCYTE ESTERASE NEGATIVE  NEGATIVE                      [facility]  









                    ID                  Date                Data Source

 

                    [id number]       10/12/2020 03:12:00 PM EDT [facility]

l

 

                                         Left Hip DATE OF EXAMINATION: 10/12/202

0 14:11 EDT HIP UNILATERAL COMPLETE 

INDICATION:   Pain COMPARISON:  None  TECHNIQUE:   2 views of the hip were 
obtained.  FINDINGS:  No fracture or dislocation.  The bone density appears 
normal with noevidence of lytic or blastic lesions.  The joint space appears 
normal.  IMPRESSION:  Negative exam.  Electronically signed in  by: Semaj Augustin M.D. 10/12/2020 15:06 EDT  









          Name      Value     Range     Interpretation Code Description Data Janice

rce(s) Supporting 

Document(s)

 

                                                                       









                    ID                  Date                Data Source

 

                    [id number]       10/12/2020 03:11:00 PM EDT [facility]

l

 

                                         Right Hip DATE OF EXAMINATION: 10/12/20

20 14:11 EDT HIP UNILATERAL COMPLETE 

INDICATION:   Pain COMPARISON:  None  TECHNIQUE:    2 views of the hip were 
obtained.  FINDINGS:  No fracture or dislocation.  The bone density appears 
normal with noevidence of lytic or blastic lesions.  The joint space appears 
normal.  IMPRESSION:  Negative exam.  Electronically signed in  by: Semaj Augustin M.D. 10/12/2020 15:05 EDT  









          Name      Value     Range     Interpretation Code Description Data Janice

rce(s) Supporting 

Document(s)

 

                                                                       









                    ID                  Date                Data Source

 

                    [id number]       10/12/2020 03:11:00 PM EDT [facility]

l

 

                                        LUMBAR SPINE DATE OF EXAMINATION: 10/12/

2020 14:11 EDT SPINE LUMBAR COMP 

INDICATION:  Pain  COMPARISON:  None  TECHNIQUE:  AP, lateral, bilateral oblique
views of the lumbar spine wereobtained . FINDINGS: There is no fracture or 
subluxation. There is degenerative disc spacenarrowing at T11-T12, T12-L1, L1-
L2. Small osteophytes at several levels.Paraspinal soft tissues are 
unremarkable. No evidence of pars defect orspondylolisthesis.   IMPRESSION:  
Degenerative changes lower thoracic and upper lumbar spine Electronically signed
in  by: Semaj Augustin M.D. 10/12/2020 15:05 EDT  









          Name      Value     Range     Interpretation Code Description Data [facility]

rce(s) Supporting 

Document(s)

 

                                                                       









                    ID                  Date                Data Source

 

                    [id number]    10/08/2020 02:25:00 PM EDT [facility]









          Name      Value     Range     Interpretation Code Description [facility] 

Document(s)

 

          CHOLESTEROL 204 mg/dL 0-200     H                   [facility]  

 

          TRIGLYCERIDES 172 mg/dL 0-150     H                   [facility]  

 

          LDL CHOLESTEROL 134 mg/dL 0-100     H                   [facility]

  

 

          HDL CHOLESTEROL 36 mg/dL  40-60     L                   [facility]

  

 

          CHOL/HDL RATIO 5.7       0.0-5.0   H                   [facility] 

 









                    ID                  Date                Data Source

 

                    [id number]    10/08/2020 02:25:00 PM T [facility]









          Name      Value     Range     Interpretation Code Description [facility] 

Document(s)

 

          GLUCOSE   102 mg/dL                         [facility]  

 

          BLOOD UREA NITROGEN 16 mg/dL  7-18                          [facility]

ital  

 

          CREATININE 1.4 mg/dL 0.7-1.3   H                   [facility]  

 

          SODIUM    140 mmol/L 136-145                       [facility]  

 

          POTASSIUM 4.7 mmol/L 3.5-5.1                       [facility]  

 

          CHLORIDE  103 mmol/L                         [facility]  

 

          CO2       30 mmol/L 21-32                         [facility]  

 

          CALCIUM   9.5 mg/dL 8.5-10.1                      [facility]  

 

          ANION GAP 7.0 mmol/L 5-12                          [facility]  

 

          GLOMERULAR FILTRATION RATE 54 mL/min                               [facility]

[facility]  

 

                                        GFR IS CALCULATED IN mL/min/1.73m2 YONATHAN

L FUNCTION:                          

>90MILDLY DECREASED:                        60-89MILDY TO MODERATELY DECREASED: 
         45-59        MODERATELY TO SEVERELY DECREASED:        30-44SEVERELY 
DECREASED:                      15-29RENAL FAILURE:                            
<15 

 

          AST       19 U/L    15-37                         [facility]  

 

          ALT       43 U/L    12-78                         [facility]  

 

          ALKALINE PHOSPHATASE 80 U/L                            [facility]  

 

          TOTAL BILIRUBIN 1.0 mg/dL 0.2-1.0                       [facility]

  

 

          TOTAL PROTEIN 8.0 g/dl  6.4-8.2                       [facility]  

 

          ALBUMIN   4.3 gm/dL 3.4-5.0                       [facility]  









                    ID                  Date                Data Source

 

                    LIPID PROFILE       10/08/2020 04:21:20 AM EDT eCW1 ([facility])









          Name      Value     Range     Interpretation Code Description Data Janice

rce(s) Supporting 

Document(s)

 

                    121       0-100               LDL CHOLESTEROL eCW1 ([facility])  

 

                    204                           CHOLESTEROL eCW1 ([facility])  

 

             Cholesterol in LDL [Mass/volume] in Serum or Plasma by calculation 

134                                    LDL 

CHOLESTEROL               eCW1 ([facility])  

 

                    36                            HDL CHOLESTEROL eCW1 ([facility])  

 

                    172                           TRIGLYCERIDES eCW1 ([facility])  

 

                    5.7                           CHOL/HDL RATIO eCW1 ([facility])  









                    ID                  Date                Data Source

 

                    [id number]       10/04/2020 07:15:00 AM EDT [facility]

 

                                          Patient: ANITHA SILVER Observation

 Report - Physicians/Mid Levels MRN: 

[id number] [facility]al Health [facility].VisitID: [id number] [place] [address] [phone], MRegistration Date/Time: 10/04/2020 05:06 Weight:112 kg 
(S). Height/Length:72 inches (S). BMI:33.5        PAST 
HISTORYProblems:Bronchitis.Back Pain.Anxiety.Viral 
Disease.Tendonitis.Pharyngitis.Sinusitis.Gastroesophageal Reflux Disease.UTI - 
Urinary Tract Infection.Anxiety Reaction.Mental Illness.   Additional 
Surgeries:Cholecystectomy.   Medications:ClonazePAM Oral (Tablet 1 mg) 1 tablet,
  at bedtime, last dose  last night.Protonix Oral (Tablet Delayed Release 40 
mg),  daily, last dose  this am.SEROquel Oral (Tablet 25 mg) 1 tablet,  at 
bedtime, last dose  last night.Zoloft Oral (Tablet 100 mg) 1 tablet,  at 
bedtime, last dose  this am.  Allergies:No Known Environmental 
Allergies.Vicodin. (hallucinations).   FAMILY HISTORYNo significant family 
medical history.        (Electronically signed by Tyler Limon P.A. 
10/04/2020 06:48)   









          Name      Value     Range     Interpretation Code Description Data Janice

rce(s) Supporting 

Document(s)

 

                                                                       









                    ID                  Date                Data Source

 

                    [id number] REFLEX 10/04/2020 05:23:00 AM EDT [facility]

ital

 

                                        TSYSORDER [id number] 









          Name      Value     Range     Interpretation Code Description Data [facility]

rce(s) Supporting 

Document(s)

 

          URINE COLOR. [facility]  

 

          URINE APPEARANCE CLEAR                                   [facility]

l  

 

          SPECIFIC GRAVITY,URINE 1.020     1.001-1.035                     [facility]  

 

          URINE LEUKOCYTE ESTERASE NEGATIVE  NEGATIVE                      [facility]  

 

          URINE NITRATE NEGATIVE  NEGATIVE                      [facility]  

 

          PH,URINE  6.5       5.0-9.0                       [facility]  

 

          URINE PROTEIN NEGATIVE mg/dL NEGATIVE                      [facility]

[facility]  

 

          URINE GLUCOSE (UA) NEGATIVE mg/dL NEGATIVE                      [facility]  

 

          URINE KETONE NEGATIVE mg/dL NEGATIVE                      [facility]

al  

 

          URINE UROBILINOGEN NORMAL(0.2-1) mg/dL 0-1                           R

[facility]  

 

          URINE BILIRUBIN NEGATIVE  NEGATIVE                      [facility]

  

 

          URINE BLOOD NEGATIVE  NEGATIVE                      [facility]  









                    ID                  Date                Data Source

 

                    [id number]       2020 07:39:00 AM EDT [facility]

l

 

                                          Patient: ANITHA SILVER Observation

 Report - Physicians/Mid Levels MRN: 

[id number].VisitID: [id number] [place] [address] [phone], MRegistration Date/Time: 2020 19:32 Weight:112 kg 
(S). Height/Length:72 inches (S). BMI:33.5        PAST HISTORYProblems:Back 
Pain.Bronchitis.Anxiety.Anxiety Reaction.Mental Illness.Tendonitis.Viral 
Disease.Pharyngitis.Sinusitis.   Additional Surgeries:Cholecystectomy.   
Medications:ClonazePAM Oral (Tablet 1 mg) 1 tablet,  at bedtime, last dose  last
 night.Protonix Oral (Tablet Delayed Release 40 mg),  daily, last dose  this 
am.SEROquel Oral (Tablet 25 mg) 1 tablet,  at bedtime, last dose  last 
night.Zoloft Oral (Tablet 100 mg) 1 tablet,  at bedtime, last dose  this am.  
Allergies:No Known Environmental Allergies.Vicodin. (hallucinations).   FAMILY 
HISTORYNegative. No significant family medical history.        (Electronically 
signed by RICCARDO Verma 2020 07:30)   









          Name      Value     Range     Interpretation Code Description Data Janice

rce(s) Supporting 

Document(s)

 

                                                                       









                    ID                  Date                Data Source

 

                    [id number]   2020 11:24:00 AM EDT [facility]

sarthak









          Name      Value     Range     Interpretation Code Description Data Janice

rce(s) Supporting 

Document(s)

 

          ORGANISM                                          [facility]  

 

          COLONY COUNT                     N                   [facility] 

 









                    ID                  Date                Data Source

 

                    [id number] 2020 06:05:00 AM EDT [facility]

al









          Name      Value     Range     Interpretation Code Description Data Janice

rce(s) Supporting 

Document(s)

 

          CHLAMYDIA TRACHOMATIS, ARMANDO Negative  Negative                      [facility]  

 

          NEISSERIA GONORRHOEAE, ARMANDO Negative  Negative                      [facility]  

 

                                        Performed at:  RN - [facility] [place] [id number] 

Director: Araceli B Reyes MD, Phone:  [phone] 









                    ID                  Date                Data Source

 

                    [id number]         2020 06:05:00 AM EDT [facility]









          Name      Value     Range     Interpretation Code Description Data Janice

rce(s) Supporting 

Document(s)

 

          Chlamydia/GC Amplification                                         Lab

Carlita    

 

                                           TESTS               RESULT  FLAG  UNI

TS    REF RANGE  

LAB------------------------------------------------------------C trachomatis, 
ARMANDO    Negative               (Negative)   01N gonorrhoeae, ARMANDO    Negative     
          (Negative)   
01------------------------------------------------------------    FLAG LEGEND:  
  L-Low Normal,H-High Normal,LL-Alert Low,HH-Alert High    <-Panic Low,>-Panic 
High,A-Abnormal,AA-Critical 
Abnormal------------------------------------------------------------Performed 
at:01 RN    [facility] [address]  [address]   Araceli B Reyes MD, Phone: [phone] 









                    ID                  Date                Data Source

 

                    [id number]   2020 08:15:00 PM EDT [facility]

l

 

                                        TSYSORDER [id number] 









          Name      Value     Range     Interpretation Code Description Data Janice

rce(s) Supporting 

Document(s)

 

          URINE RBC 5-10 /hpf 0-3       H                   [facility]  

 

          URINE WBC 5-10 /hpf 0-5       H                   [facility]  

 

          URINE EPITHELIAL CELLS 2+ /hpf   0                             [facility]

ospital  

 

          URINE BACTERIA 3+        NONE SEEN                     [facility] 

 

 

                                        CULTURE ADDED TO SAMPLE. 









                    ID                  Date                Data Source

 

                    [id number] 2020 08:11:00 PM EDT [facility]

ital

 

                                        TSYSORDER [id number] 









          Name      Value     Range     Interpretation Code Description Data Janice

rce(s) Supporting 

Document(s)

 

          URINE COLOR. YELLOW                                  [facility]  

 

          URINE APPEARANCE SLIGHTY CLOUDY                               [facility]

spital  

 

          SPECIFIC GRAVITY,URINE 1.015     1.001-1.035                     [facility]  

 

          URINE LEUKOCYTE ESTERASE 1+(SMALL) NEGATIVE  [facility]  

 

          URINE NITRATE NEGATIVE  NEGATIVE                      [facility]  

 

          PH,URINE  5.5       5.0-9.0                       [facility]  

 

          URINE PROTEIN TRACE mg/dL NEGATIVE  [facility]

  

 

          URINE GLUCOSE (UA) NEGATIVE mg/dL NEGATIVE                      [facility]  

 

          URINE KETONE NEGATIVE mg/dL NEGATIVE                      [facility]

al  

 

          URINE UROBILINOGEN NORMAL(0.2-1) mg/dL 0-1                           [facility]  

 

          URINE BILIRUBIN NEGATIVE  NEGATIVE                      [facility]

  

 

          URINE BLOOD 2+(MODERATE) NEGATIVE  [facility] 

 







                                        Procedure

 

                                          



                                                                                
                                                                                
                                                                                
                                                          



Vital Signs

          



                    ID                  Date                Data Source

 

                    UNK                                      









           Name       Value      Range      Interpretation Code Description Data

 Source(s)

 

           Respiratory rate 18 /min                          18 /min    eCW1 ([facility])

 

           Heart rate 66 /min                          66 /min    eCW1 ([facility])

 

           Body mass index (BMI) [Ratio] 42.82 kg/m2                       42.82

 kg/m2 eCW1 ([facility])

 

           Body weight 290 [lb_av]                       290 [lb_av] eCW1 ([facility])

 

           Body height 69 [in_i]                        69 [in_i]  eCW1 ([facility])









                    ID                  Date                Data Source

 

                    [id number]           2020 11:13:00 AM EST [facility]

l









           Name       Value      Range      Interpretation Code Description Data

 Source(s)

 

           WEIGHT     113.39 kilos                       113.39 kilos [facility]

ital

 

           HEIGHT     182.88 centimeters                       182.88 centimeter

[facility]

 

           WEIGHT     113.39 kilos                       113.39 kilos [facility]

ital

 

           HEIGHT     182.88 centimeters                       182.88 centimeter

[facility]









                    ID                  Date                Data Source

 

                    [id number]           2020 11:13:00 AM EST [facility]

l









           Name       Value      Range      Interpretation Code Description Data

 Source(s)

 

           WEIGHT     117.93 kilos                       117.93 kilos [facility]

ital

 

           HEIGHT     182.88 centimeters                       182.88 centimeter

[facility]

 

           WEIGHT     117.93 kilos                       117.93 kilos [facility]

ital

 

           HEIGHT     182.88 centimeters                       182.88 centimeter

[facility]



                                                                                
                                      



Patient Treatment Plan of Care

          



             Planned Activity Planned Date Details      Description  Data Source

(s)

 

             physical therapy eval and tx - 10/08/2020 12:00:00 AM EDT          

                 eCW1 ([facility])

 

             atorvastatin 20 MG Oral Tablet [Lipitor] 10/08/2020 12:00:00 AM EDT

                           eCW1 

([facility])

 

                          Tamsulosin hydrochloride 0.4 MG Oral Capsule [Flomax] 

10/08/2020 12:00:00 AM EDT

                                                            eCW1 ([facility])

 

             physical therapy eval and tx - 10/08/2020 12:00:00 AM EDT          

                 eCW1 ([facility])

 

             physical therapy eval and tx - 10/08/2020 12:00:00 AM EDT          

                 eCW1 ([facility])

## 2021-02-05 ENCOUNTER — HOSPITAL ENCOUNTER (EMERGENCY)
Dept: HOSPITAL 53 - M ED | Age: 46
Discharge: LEFT BEFORE DISCHARGE | End: 2021-02-05
Payer: COMMERCIAL

## 2021-02-05 VITALS — DIASTOLIC BLOOD PRESSURE: 80 MMHG | SYSTOLIC BLOOD PRESSURE: 145 MMHG

## 2021-02-05 DIAGNOSIS — Z53.21: Primary | ICD-10-CM

## 2021-02-05 NOTE — CCD
Summarization of Episode Note

                             Created on: 2021



Amando Mr. Oleksandr CASTRO

External Reference #: [id number]

: 1975

Sex: Male



Demographics





                          Address                   [address] [place]  [address]

 

                          Home Phone                [phone]

 

                          Preferred Language        Unknown

 

                          Marital Status            Unknown

 

                          Pentecostalism Affiliation     Unknown

 

                          Race                      White

 

                          Ethnic Group              Not  or 





Author





                          Author                    [facility]

 

                          Organization              [facility]

 

                          Address                   Unknown

 

                          Phone                     Unavailable







Support





                Name            Relationship    Address         Phone

 

                    Oleksandr Goel     GUAR                [address] [place]  [address]                    [phone]

 

                    Ameena Goel       ECON                [address] [place]  [address]                    [phone]







Care Team Providers





                    Care Team Member Name Role                Phone

 

                    Reena Ji  Unavailable         [phone]







PROBLEMS





          Type      Condition ICD9-CM Code YTN70-PX Code Onset Dates Condition S

tatus W/U 

Status              Risk                SNOMED Code         Notes

 

          Problem   Morbid (severe) obesity due to excess calories           E66

.01              Active    

confirmed                               [id number]            

 

       Problem Encounter for immunization        Z23           Active confirmed 

       [id number]  

 

       Problem Allergic rhinitis        J30.9         Active confirmed        61

496399  

 

       Problem Weight loss        R63.4         Active confirmed        86787227

  

 

       Problem Vaccine counseling        Z71.89        Active confirmed        3

[id number]  

 

       Problem Hyperlipidemia        E78.5         Active confirmed        43016

004  

 

        Problem Panic disorder without agoraphobia         F41.0           Activ

e  confirmed         [id number]

                                         

 

       Problem Post-traumatic stress disorder        F43.10        Active confir

med        49689094  

 

        Problem Body mass index (BMI) 45.0-49.9, adult         Z68.42          A

ctive  confirmed         

[id number]                                

 

       Problem Panic         F41.0         Active confirmed        51773379  

 

       Problem BMI 40.0-44.9, adult        Z68.41        Active confirmed       

 [id number]  

 

       Problem Sciatica, left side        M54.32        Active confirmed        

76424628  

 

       Problem Anxiety disorder, unspecified        F41.9         Active confirm

ed        821882624  

 

       Problem Sciatica, right side        M54.31        Active confirmed       

 [id number]  

 

       Problem Borderline personality disorder        F60.3         Active confi

rmed        74806200  

 

        Problem Other and unspecified hyperlipidemia 272.4                   Act

bon  confirmed         

29558046                                 

 

          Problem   Gastro-esophageal reflux disease without esophagitis        

   K21.9               Active    

confirmed                               [id number]            

 

           Problem    Adjustment disorder with mixed anxiety and depressed mood 

           F43.23                

Active          confirmed                       [id number]       r/t pandemic

 

                          Problem                   Class 2 obesity due to exces

s calories without serious comorbidity in 

adult, unspecified BMI         E66.09          Active  confirmed         3311003

01  

 

                          Problem                   Benign prostatic hyperplasia

, unspecified whether lower urinary tract 

symptoms present         N40.0           Active  confirmed         [id number]  







ALLERGIES

No Known Allergies



ENCOUNTERS from 1975 to 2021





             Encounter    Location     Date         Provider     Diagnosis

 

                    [address] [address]                  Reena Ji          Benign prostatic hyperplasia

, unspecified whether 

lower urinary tract symptoms present N40.0







IMMUNIZATIONS





                Vaccine         Route           Administration Date Status

 

                INFLUENZA 3 YRS AND OLDER Preservative free Unknown         Dec 

04, 2018    Administered

 

                INFLUENZA 3 YRS AND OLDER Preservative free IM Intramuscular 2016    

Administered







SOCIAL HISTORY

Sex Assigned At Birth:



                          Social History Observation Description

 

                          Sex Assigned At Birth     Unknown



Alcohol Screen



                    Question            Answer              Notes

 

                    Did you have a drink containing alcohol in the past year? No

                   

 

                    Points              0                    

 

                    Interpretation      Negative             







REASON FOR REFERRAL

No Information



VITAL SIGNS

No information



MEDICATIONS





           Medication SIG (Take, Route, Frequency, Duration) Notes      Start Da

te End Date   

Status

 

           Propranolol HCl 20 MG 1 tablet Orally tid                

        Active

 

           physical therapy eval and tx - - - -3x per week for -6 weeks         

   08 Oct, 2020            

Active

 

           HydrOXYzine HCl 25 MG 2 Orally every 8 hrs               

         Active

 

           Lipitor 20 MG 1 tablet Orally Once a day for 90 days            2020            Active

 

           Lorazepam 0.5 MG 1 Orally Once a day for 5 days                        Active

 

           Seroquel 100 MG ii tablet Orally HS                                  

Active

 

                          Albuterol Sulfate  (90 Base) MCG/ACT 2 puffs as

 needed Inhalation every 4

hrs for 17                                                      Active

 

           Flomax 0.4 MG 1 capsule Orally Once a day for 90 day(s)            08

 Oct, 2020            Active

 

           Zoloft 100 MG TAKE ONE TABLET BY MOUTH EVERY DAY Orally Once a day   

                               Active

 

           Pantoprazole Sodium 40 MG 1 tablet Orally Once a day for 30 days     

                             Active

 

             Lithium Carbonate 300 MG TAKE ONE TABLET BY MOUTH IN THE EVENING Or

ally qd                            

                                        Active

 

           Halcion 0.25 MG 1 tablet Orally qhs prn only            10 Mar, 2020 

           Active

 

           physical therapy eval and tx - - - -3x per week for -6 weeks         

   08 Oct, 2020            

Active

 

                          Saphris 10 MG             1 tablet under the tongue an

d allow to dissolve Sublingual Twice a

day                                                             Active







PROCEDURES

No Information



RESULTS

No Results



REASON FOR VISIT

Rx refill



MEDICAL (GENERAL) HISTORY





                    Type                Description         Date

 

                    Medical History     GERD                 

 

                    Medical History     Anxiety              

 

                    Medical History     Obesity              

 

                    Medical History     Allergic rhinitis    

 

                    Surgical History    Laparscopic Cholecystectomy 11/15/12

 

                    Hospitalization History Chest pain           







Goals Section

No Information



Health Concerns

No Information



MEDICAL EQUIPMENT

No Information



MENTAL STATUS

No Information



FUNCTIONAL STATUS

No Information



ASSESSMENTS





             Encounter Date Diagnosis    Assessment Notes Treatment Notes Treatm

ent Clinical 

Notes

 

                                        Benign prostatic hyperplasia

, unspecified whether lower urinary 

tract symptoms present (ICD-10 - N40.0)                           



                                        











PLAN OF TREATMENT

Medication



                Medication Name Sig             Start Date      Stop Date

 

                Flomax 0.4 MG   1 capsule Orally Once a day for 90 day(s) 08 Oct

, 2020     

 

                Lorazepam 0.5 MG 1 Orally Once a day for 5 days     

 



Next Appt



                                        Details

 

                                        Provider Name:Reena Urena, 2021

5 01:20:00 PM, [address] [place], [address], [phone]

 

                                        Provider Name:Seda Irving,  11:40:00 AM, [address] [place], [address], [phone]







Insurance Providers





             Payer Name   Payer Address Payer Phone  Insured Name Patient Relati

onship to 

Insured                   Coverage Start Date       Coverage End Date

 

                    [employer] [address]  [facility] [address] 

[phone]    Oleksandr Goel

## 2021-02-05 NOTE — CCD
Summarization Of Episode

                             Created on: 2021



ANITHA GOEL

External Reference #: [id number]

: 1975

Sex: Male



Demographics





                          Address                   [address] [place]  [address]

 

                          Home Phone                [phone]

 

                          Preferred Language        English

 

                          Marital Status            

 

                          Anglic[place] Affiliation     [place]

 

                          Race                      White

 

                          Ethnic Group              Not  or 





Author





                          Author                    [facility]

 

                          Organization              [facility]

 

                          Address                   Unknown

 

                          Phone                     Unavailable







Support





                Name            Relationship    Address         Phone

 

                DISABLED        Next Of Kin     Unknown         Unavailable

 

                    SONNY GOEL Next Of Kin         [address] [place]  [address]                    [phone]

 

                UE              Next Of Kin     Unknown         Unavailable

 

                    SAPPHIRE GOEL    Next Of Kin         [address] [place]  [address]                    [phone]

 

                    ANJALI GOEL      Next Of Kin         [address] [place]  [address]                    [phone]

 

                    Ameena Goel                [address] [place]  [address]                    Unavailable







Care Team Providers





                    Care Team Member Name Role                Phone

 

                    SYMENOW, G CHRISTOPHER PA Unavailable         Unavailable

 

                    SYMENOW, G CHRISTOPHER PA Unavailable         Unavailable

 

                    SYMENOW, G CHRISTOPHER PA Unavailable         Unavailable

 

                    SYMENOW, G CHRISTOPHER PA Unavailable         Unavailable

 

                    SYMENOW, G CHRISTOPHER PA Unavailable         Unavailable

 

                    SYMENOW, G CHRISTOPHER PA Unavailable         Unavailable

 

                    SYMENOW, G CHRISTOPHER PA Unavailable         Unavailable

 

                    SYMENOW, G CHRISTOPHER PA Unavailable         Unavailable

 

                    SYMENOW, G CHRISTOPHER PA Unavailable         Unavailable

 

                    SYMENOW, G CHRISTOPHER PA Unavailable         Unavailable

 

                    SYMENOW, G CHRISTOPHER PA Unavailable         Unavailable

 

                    SYMENOW, G CHRISTOPHER PA Unavailable         Unavailable

 

                    SYMENOW, G CHRISTOPHER PA Unavailable         Unavailable

 

                    SYMENOW, G CHRISTOPHER PA Unavailable         Unavailable

 

                    SYMENOW, G CHRISTOPHER PA Unavailable         Unavailable

 

                    SYMENOW, G CHRISTOPHER PA Unavailable         Unavailable

 

                    SYMENOW, G CHRISTOPHER PA Unavailable         Unavailable

 

                    HODANEVA PA Unavailable         Unavailable

 

                    HODANEVA PA Unavailable         Unavailable

 

                    HODANEVA PA Unavailable         Unavailable

 

                    HODANEVA PA Unavailable         Unavailable

 

                    HODANEVA PA Unavailable         Unavailable

 

                    HODANEVA PA Unavailable         Unavailable

 

                    HODANEVA PA Unavailable         Unavailable

 

                    HODANEVA PA Unavailable         Unavailable

 

                    HODANEVA PA Unavailable         Unavailable

 

                    HODANEVA PA Unavailable         Unavailable

 

                    HODANEVA PA Unavailable         Unavailable

 

                    HODANEVA PA Unavailable         Unavailable

 

                    HODANEVA PA Unavailable         Unavailable

 

                    HODAN, L TOM PA Unavailable         Unavailable

 

                    HODAN, L TOM PA Unavailable         Unavailable

 

                    HODAN, L TOM PA Unavailable         Unavailable

 

                    HODAN, L TOM PA Unavailable         Unavailable

 

                    HODAN, L TOM PA Unavailable         Unavailable

 

                    HODAN, L TOM PA Unavailable         Unavailable

 

                    DOMINGO,  RUBY PA  Unavailable         Unavailable

 

                    DOMINGO,  RUBY PA  Unavailable         Unavailable

 

                    DOMINGO,  RUBY PA  Unavailable         Unavailable

 

                    DOMINGO,  RUBY PA  Unavailable         Unavailable

 

                    DOMINGO,  RUBY PA  Unavailable         Unavailable

 

                    DOMINGO,  RUBY PA  Unavailable         Unavailable

 

                    DOMINGO,  RUBY PA  Unavailable         Unavailable

 

                    DOMINGO,  RUBY PA  Unavailable         Unavailable

 

                    DOMINGO,  RUBY PA  Unavailable         Unavailable

 

                    DOMINGO,  RUBY PA  Unavailable         Unavailable

 

                    DOMINGO,  RUBY PA  Unavailable         Unavailable

 

                    DOMINGO,  RUBY PA  Unavailable         Unavailable

 

                    DOMINGO,  RUBY PA  Unavailable         Unavailable

 

                    DOMINGO,  RUBY PA  Unavailable         Unavailable

 

                    DOMINGO,  RUBY PA  Unavailable         Unavailable

 

                    EVA KASPER MD Unavailable         Unavailable

 

                    EVA KASPER MD Unavailable         Unavailable

 

                    EVA KASPER MD Unavailable         Unavailable

 

                    MARIE, W SCOTT PA Unavailable         Unavailable

 

                    MARIE, W SCOTT PA Unavailable         Unavailable

 

                    MARIE, W SCOTT PA Unavailable         Unavailable

 

                    MARIE, W SCOTT PA Unavailable         Unavailable

 

                    MARIE, W SCOTT PA Unavailable         Unavailable

 

                    MARIE, W SCOTT PA Unavailable         Unavailable

 

                    MARIE, W SCOTT PA Unavailable         Unavailable

 

                    MARIE, W SCOTT PA Unavailable         Unavailable

 

                    MARIE, W SCOTT PA Unavailable         Unavailable

 

                    MARIE, W SCOTT PA Unavailable         Unavailable

 

                    MARIE, W SCOTT PA Unavailable         Unavailable

 

                    MARIE, W SCOTT PA Unavailable         Unavailable

 

                    MARIE, W SCOTT PA Unavailable         Unavailable

 

                    MARIE, W SCOTT PA Unavailable         Unavailable

 

                    MARIE, W SCOTT PA Unavailable         Unavailable

 

                    MARIE, W SCOTT PA Unavailable         Unavailable

 

                    MARIE, W SCOTT PA Unavailable         Unavailable

 

                    MARIE, W SCOTT PA Unavailable         Unavailable

 

                    MARIE, W SCOTT PA Unavailable         Unavailable

 

                    MARIE, W SCOTT PA Unavailable         Unavailable

 

                    MARIE, W SCOTT PA Unavailable         Unavailable

 

                    MARIE, W SCOTT PA Unavailable         Unavailable

 

                    MARIE, W SCOTT PA Unavailable         Unavailable

 

                    MARIE, W SCOTT PA Unavailable         Unavailable

 

                    MARIE, W SCOTT PA Unavailable         Unavailable

 

                    MARIE, W SCOTT PA Unavailable         Unavailable

 

                    MARIE, W SCOTT PA Unavailable         Unavailable

 

                    MARIE, W SCOTT PA Unavailable         Unavailable

 

                    MARIE, W SCOTT PA Unavailable         Unavailable

 

                    MARIE, W SCOTT PA Unavailable         Unavailable

 

                    MARIE, W SCOTT PA Unavailable         Unavailable

 

                    MARIE, W SCOTT PA Unavailable         Unavailable

 

                    MARIE, W SCOTT PA Unavailable         Unavailable

 

                    MARIE, W SCOTT PA Unavailable         Unavailable

 

                    MARIE, W SCOTT PA Unavailable         Unavailable

 

                    MARIE, W SCOTT PA Unavailable         Unavailable

 

                    MARIE, W SCOTT PA Unavailable         Unavailable

 

                    MARIE, W SCOTT PA Unavailable         Unavailable

 

                    MARIE, W SCOTT PA Unavailable         Unavailable

 

                    MARIE, W SCOTT PA Unavailable         Unavailable

 

                    MARIE, W SCOTT PA Unavailable         Unavailable

 

                    MARIE, W SCOTT PA Unavailable         Unavailable

 

                    MARIE, W SCOTT PA Unavailable         Unavailable

 

                    MARIE, W SCOTT PA Unavailable         Unavailable

 

                    MARIE, W SCOTT PA Unavailable         Unavailable

 

                    MARIE, W SCOTT PA Unavailable         Unavailable

 

                    ASHLY, LUIS TYLER PA Unavailable         Unavailable

 

                    ASHLY, LUIS TYLER PA Unavailable         Unavailable

 

                    ASHLY, LUIS TYLER PA Unavailable         Unavailable

 

                    ASHLY, LUIS TYLER PA Unavailable         Unavailable

 

                    ASHLY, LUIS TYLER PA Unavailable         Unavailable

 

                    ASHLY, LUIS TYLER PA Unavailable         Unavailable

 

                    ASHLY, LUIS TYLER PA Unavailable         Unavailable

 

                    ASHLY, LUIS TYLER PA Unavailable         Unavailable

 

                    ASHLY, LUIS TYLER PA Unavailable         Unavailable

 

                    ASHLY, LUIS TYLER PA Unavailable         Unavailable

 

                    ASHLY, LUIS TYLER PA Unavailable         Unavailable

 

                    ASHLY, LUIS TYLER PA Unavailable         Unavailable

 

                    ASHLY, LUIS TYLER PA Unavailable         Unavailable

 

                    ASHLY, LUIS TYLER PA Unavailable         Unavailable

 

                    ASHLY, LUIS TYLER PA Unavailable         Unavailable

 

                    ASHLY, LUIS TYLER PA Unavailable         Unavailable

 

                    ASHLY, LUIS TYLER PA Unavailable         Unavailable

 

                    ASHLY, LUIS TYLER PA Unavailable         Unavailable

 

                    ASHLY, LUIS TYLER PA Unavailable         Unavailable

 

                    ASHLY, LUIS TYLER PA Unavailable         Unavailable

 

                    ASHLY, LUIS TYLER PA Unavailable         Unavailable

 

                    TACHO KHAN MD    Unavailable         Unavailable

 

                    TACHO KHAN MD    Unavailable         Unavailable

 

                    TACHO KHAN MD    Unavailable         Unavailable

 

                    TACHO KHAN MD    Unavailable         Unavailable

 

                    TACHO KHAN MD    Unavailable         Unavailable

 

                    TACHO KHAN MD    Unavailable         Unavailable

 

                    TACHO KHAN MD    Unavailable         Unavailable

 

                    TACHO KHAN MD    Unavailable         Unavailable

 

                    TACHO KHAN MD    Unavailable         Unavailable

 

                    OLIVAREZ SR, ANITHA CABRAL MD Unavailable         Unavailable

 

                    OLIVAREZ SR, ANITHA CABRAL MD Unavailable         Unavailable

 

                    OLIVAREZ SR, ANITHA CABRAL MD Unavailable         Unavailable

 

                    OLIVAREZ SR, ANITHA CABRAL MD Unavailable         Unavailable

 

                    OLIVAREZ SR, ANITHA CABRAL MD Unavailable         Unavailable

 

                    OLIVAREZ SR, ANITHA CABRAL MD Unavailable         Unavailable

 

                    OLIVAREZ SR, ANITHA CABRAL MD Unavailable         Unavailable

 

                    OLIVAREZ SR, ANITHA CABRAL MD Unavailable         Unavailable

 

                    OLIVAREZ SR, ANITHA CABRLA MD Unavailable         Unavailable

 

                    OLIVAREZ SR, ANITHA CABRAL MD Unavailable         Unavailable

 

                    OLIVAREZ SR, ANITHA CABRAL MD Unavailable         Unavailable

 

                    OLIVAREZ SR, ANITHA CABRAL MD Unavailable         Unavailable

 

                    OLIVAREZ SR, ANITHA CABRAL MD Unavailable         Unavailable

 

                    OLIVAREZ SR, ANITHA CABRAL MD Unavailable         Unavailable

 

                    OLIVAREZ SR, ANITHA CABRAL MD Unavailable         Unavailable

 

                    OLIVAREZ SR, ANITHA CABRAL MD Unavailable         Unavailable

 

                    OLIVAREZ SR, ANITHA CABRAL MD Unavailable         Unavailable

 

                    OLIVAREZ SR, ANITHA CABRAL MD Unavailable         Unavailable

 

                    OLIVAREZ SR, ANITHA CABRAL MD Unavailable         Unavailable

 

                    OLIVAREZ SR, ANITHA CABRAL MD Unavailable         Unavailable

 

                    OLIVAREZ SR, ANITHA CABRAL MD Unavailable         Unavailable

 

                    OLIVAREZ SR, ANITHA CABRAL MD Unavailable         Unavailable

 

                    OLIVAREZ SR, ANITHA CABRAL MD Unavailable         Unavailable

 

                    OLIVAREZ SR, ANITHA CABRAL MD Unavailable         Unavailable

 

                    OLIVAREZ SR, ANITHA CABRAL MD Unavailable         Unavailable

 

                    OLIVAREZ SR, ANITHA CABRAL MD Unavailable         Unavailable

 

                    OLIVAREZ SR, ANITHA CABRAL MD Unavailable         Unavailable

 

                    OLIVAREZ SR, ANITHA CABRAL MD Unavailable         Unavailable

 

                    OLIVAREZ SR, ANITHA CABRAL MD Unavailable         Unavailable

 

                    OLIVAREZ SR, ANITHA CABRAL MD Unavailable         Unavailable

 

                    OLIVAREZ SR, ANITHA CABRAL MD Unavailable         Unavailable

 

                    OLIVAREZ SR, ANITHA CABRAL MD Unavailable         Unavailable

 

                    OLIVAREZ SR, ANITHA CABRAL MD Unavailable         Unavailable

 

                    OLIVAREZ SR, ANITHA CABRAL MD Unavailable         Unavailable

 

                    OLIVAREZ SR, ANITHA CABRAL MD Unavailable         Unavailable

 

                    OLIVAREZ SR, ANITHA CABRAL MD Unavailable         Unavailable

 

                    OLIVAREZ SR, ANITHA CABRAL MD Unavailable         Unavailable

 

                    OLIVAREZ SR, ANITHA CABRAL MD Unavailable         Unavailable

 

                    OLIVAREZ SR, ANITHA CABRAL MD Unavailable         Unavailable

 

                    OLIVAREZ SR, ANITHA CABRAL MD Unavailable         Unavailable

 

                    OLIVAREZ SR, ANITHA CABRAL MD Unavailable         Unavailable

 

                    OLIVAREZ SR, ANITHA CABRAL MD Unavailable         Unavailable

 

                    OLIVAREZ SR, ANITHA CABRAL MD Unavailable         Unavailable

 

                    OLIVAREZ SR, ANITHA CABRAL MD Unavailable         Unavailable

 

                    OLIVAREZ SR, ANITHA CABRAL MD Unavailable         Unavailable

 

                    OLIVAREZ SR, ANITHA CABRAL MD Unavailable         Unavailable

 

                    OLIVAREZ SR, ANITHA CABRAL MD Unavailable         Unavailable

 

                    OLIVAREZ SR, ANITHA CABRAL MD Unavailable         Unavailable

 

                    OLIVAREZ SR, ANITHA CABRAL MD Unavailable         Unavailable

 

                    OLIVAREZ SR, ANITHA CABRAL MD Unavailable         Unavailable

 

                    OLIVAREZ SR, ANITHA CABRAL MD Unavailable         Unavailable

 

                    OLIVAREZ SR, ANITHA CABRAL MD Unavailable         Unavailable

 

                    OLIVAREZ SR, ANITHA CABRAL MD Unavailable         Unavailable

 

                    OLIVAREZ SR, ANITHA CABRAL MD Unavailable         Unavailable

 

                    Braxton, M Breana RPA   Unavailable         Unavailable

 

                    Braxton, M Breana RPA   Unavailable         Unavailable

 

                    Braxton, M Breana RPA   Unavailable         Unavailable

 

                    Braxton, M Breana RPA   Unavailable         Unavailable

 

                    Braxton, M Breana RPA   Unavailable         Unavailable

 

                    Braxton, M Breana RPA   Unavailable         Unavailable

 

                    Braxton, M Breana RPA   Unavailable         Unavailable

 

                    Braxton, M Breana RPA   Unavailable         Unavailable

 

                    Braxton, M Breana RPA   Unavailable         Unavailable

 

                    Braxton, M Breana RPA   Unavailable         Unavailable

 

                    Braxton, M Breana RPA   Unavailable         Unavailable

 

                    Braxton, M Breana RPA   Unavailable         Unavailable

 

                    Braxton, M Breana RPA   Unavailable         Unavailable

 

                    Braxton, M Breana RPA   Unavailable         Unavailable

 

                    Braxton, M Breana RPA   Unavailable         Unavailable

 

                    Braxton, M Breana RPA   Unavailable         Unavailable

 

                    Braxton, M Breana RPA   Unavailable         Unavailable

 

                    Braxton, M Breana RPA   Unavailable         Unavailable

 

                    Braxton, M Breana RPA   Unavailable         Unavailable

 

                    Braxton, M Breana RPA   Unavailable         Unavailable

 

                    Braxton, M Breana RPA   Unavailable         Unavailable

 

                    Braxton, M Breana RPA   Unavailable         Unavailable

 

                    Braxton, M Breana RPA   Unavailable         Unavailable

 

                    Braxton, M Breana RPA   Unavailable         Unavailable

 

                    Braxton, M Breana RPA   Unavailable         Unavailable

 

                    Braxton, M Breana RPA   Unavailable         Unavailable

 

                    Braxton, M Breana RPA   Unavailable         Unavailable

 

                    Braxton, M Breana RPA   Unavailable         Unavailable

 

                    Braxton, M Breana RPA   Unavailable         Unavailable

 

                    Braxton, M Breana RPA   Unavailable         Unavailable

 

                    Braxton, M Breana RPA   Unavailable         Unavailable

 

                    Braxton, M Breana RPA   Unavailable         Unavailable

 

                    Braxton, M Breana RPA   Unavailable         Unavailable

 

                    Braxton, M Breana RPA   Unavailable         Unavailable

 

                    Braxton, M Breana RPA   Unavailable         Unavailable

 

                    Braxton, M Breana RPA   Unavailable         Unavailable

 

                    Braxton, M Breana RPA   Unavailable         Unavailable

 

                    Braxton, M Breana RPA   Unavailable         Unavailable

 

                    Braxton, M Breana RPA   Unavailable         Unavailable

 

                    Braxton, M Breana RPA   Unavailable         Unavailable

 

                    Braxton, M Breana RPA   Unavailable         Unavailable

 

                    Braxton, M Breana RPA   Unavailable         Unavailable

 

                    JOS VALENTINE MD Unavailable         Unavailable

 

                    JOS VALENTINE MD Unavailable         Unavailable

 

                    JOS VALENTINE MD Unavailable         Unavailable

 

                    JOS VALENTINE MD Unavailable         Unavailable

 

                    JOS VALENTINE MD Unavailable         Unavailable

 

                    JOS VALENTINE MD Unavailable         Unavailable

 

                    JOS VALENTINE MD Unavailable         Unavailable

 

                    JOS VALENTINE MD Unavailable         Unavailable

 

                    JSO VALENTINE MD Unavailable         Unavailable

 

                    JOS VALENTINE MD Unavailable         Unavailable

 

                    MEEKSYED GAMBINONDA FNP-C, MSN Unavailable         Unavailab

le

 

                    MEEKSYED NICOLE FNP-C, MSN Unavailable         Unavailab

le

 

                    MEEKSYED NICOLE FNP-C, MSN Unavailable         Unavailab

le

 

                    MEEK, SYED NICOLE FNP-C, MSN Unavailable         Unavailab

le

 

                    MEEK, SYED NICOLE FNP-C, MSN Unavailable         Unavailab

le

 

                    MEEK, SYED NICOLE FNP-C, MSN Unavailable         Unavailab

le

 

                    MEEK, SYED NICOLE FNP-C, MSN Unavailable         Unavailab

le

 

                    MEEK, SYED NICOLE FNP-C, MSN Unavailable         Unavailab

le

 

                    MEEK, SYED NICOLE FNP-C, MSN Unavailable         Unavailab

le

 

                    MEEK, SYED NICOLE FNP-C, MSN Unavailable         Unavailab

le

 

                    MEEK, SYED NICOLE FNP-C, MSN Unavailable         Unavailab

le

 

                    MEEK, SYED NICOLE FNP-C, MSN Unavailable         Unavailab

le

 

                    MEEK, SYED NICOLE FNP-C, MSN Unavailable         Unavailab

le

 

                    MEEK, SYED NICOLE FNP-C, MSN Unavailable         Unavailab

le

 

                    MEEK, SYED NICOLE FNP-C, MSN Unavailable         Unavailab

le

 

                    MEEK, SYED NICOLE FNP-C, MSN Unavailable         Unavailab

le

 

                    MEEK, SYED NICOLE FNP-C, MSN Unavailable         Unavailab

le

 

                    MEEK, SYED NICOLE FNP-C, MSN Unavailable         Unavailab

le

 

                    MEEK, SYED NICOLE FNP-C, MSN Unavailable         Unavailab

le

 

                    MEEK, SYED NICOLE FNP-C, MSN Unavailable         Unavailab

le

 

                    MEEK, SYED NICOLE FNP-C, MSN Unavailable         Unavailab

le

 

                    MEEK, SYED NICOLE FNP-C, MSN Unavailable         Unavailab

le

 

                    MEEK, SYED NICOLE FNP-C, MSN Unavailable         Unavailab

le

 

                    MEEK, SYED NICOLE FNP-C, MSN Unavailable         Unavailab

le

 

                    MEEK, SYED NICOLE FNP-C, MSN Unavailable         Unavailab

le

 

                    MEEK, SYED NICOLE FNP-C, MSN Unavailable         Unavailab

le

 

                    MEEK, SYED NICOLE FNP-C, MSN Unavailable         Unavailab

le

 

                    MEEK, SYED NICOLE FNP-C, MSN Unavailable         Unavailab

le

 

                    MEEK, SYED NICOLE FNP-C, MSN Unavailable         Unavailab

le

 

                    MEEK, SYED NICOLE FNP-C, MSN Unavailable         Unavailab

le

 

                    MEEK, SYED NICOLE FNP-C, MSN Unavailable         Unavailab

le

 

                    MEEK, SYED NICOLE FNP-C, MSN Unavailable         Unavailab

le

 

                    MEEK, SYED NICOLE FNP-C, MSN Unavailable         Unavailab

le

 

                    MEEK, SYED NICOLE FNP-C, MSN Unavailable         Unavailab

le

 

                    MEEK, SYED NICOLE FNP-C, MSN Unavailable         Unavailab

le

 

                    MEEK, SYED NICOLE FNP-C, MSN Unavailable         Unavailab

le

 

                    MEEK, SYED NICOLE FNP-C, MSN Unavailable         Unavailab

le

 

                    MEEK, SYED JORDANA FNP-C, MSN Unavailable         Unavailab

le

 

                    MEEK, SYED JORDANA FNP-C, MSN Unavailable         Unavailab

le

 

                    MEEK, SYED JORDANA FNP-C, MSN Unavailable         Unavailab

le

 

                    MEEK, SYED JORDANA FNP-C, MSN Unavailable         Unavailab

le

 

                    MEEK, SYED JORDANA FNP-C, MSN Unavailable         Unavailab

le

 

                    MEEK, SYED JORDANA FNP-C, MSN Unavailable         Unavailab

le

 

                    ABBY, DUANE THOMAS PA-C Unavailable         Unavailable

 

                    ABBY, DUANE THOMAS PA-C Unavailable         Unavailable

 

                    ABBY, DUANE THOMAS PA-C Unavailable         Unavailable

 

                    ABBY, DUANE THOMAS PA-C Unavailable         Unavailable

 

                    ABBY, DUANE THOMAS PA-C Unavailable         Unavailable

 

                    ABBY, DUANE THOMAS PA-C Unavailable         Unavailable

 

                    ABBY, DUANE THOMAS PA-C Unavailable         Unavailable

 

                    ABBY, DUANE THOMAS PA-C Unavailable         Unavailable

 

                    ABBY, DUANE THOMAS PA-C Unavailable         Unavailable

 

                    [facility]        Unavailable         Unavailable

 

                    DESJARLAIS,  GEORGE NP Unavailable         Unavailable

 

                    DESJARLAIS,  EGORGE NP Unavailable         Unavailable

 

                    DESJARLAIS,  GEORGE NP Unavailable         Unavailable

 

                    DESJARLAIS,  GEORGE NP Unavailable         Unavailable

 

                    DESJARLAIS,  GEORGE NP Unavailable         Unavailable

 

                    DESJARLAIS,  GEORGE NP Unavailable         Unavailable

 

                    DESJARLAIS,  GEORGE NP Unavailable         Unavailable

 

                    DESJARLAIS,  GEORGE NP Unavailable         Unavailable

 

                    DESJARLAIS,  GEORGE NP Unavailable         Unavailable

 

                    Rydberg,  Anahi PA Unavailable         Unavailable

 

                    Rydberg,  Anahi PA Unavailable         Unavailable

 

                    Rydberg,  Anahi PA Unavailable         Unavailable

 

                    Rydberg,  Anahi PA Unavailable         Unavailable

 

                    Rydberg,  Anahi PA Unavailable         Unavailable

 

                    Rydberg,  Anahi PA Unavailable         Unavailable

 

                    Rydberg,  Anahi PA Unavailable         Unavailable

 

                    Rydberg,  Anahi PA Unavailable         Unavailable

 

                    Rydberg,  Anahi PA Unavailable         Unavailable

 

                    Rydberg,  Anahi PA Unavailable         Unavailable

 

                    Rydberg,  Anahi PA Unavailable         Unavailable

 

                    Rydberg,  Anahi PA Unavailable         Unavailable

 

                    Rydberg,  Anahi PA Unavailable         Unavailable

 

                    Rydberg,  Anahi PA Unavailable         Unavailable

 

                    Rydberg,  Anahi PA Unavailable         Unavailable

 

                    Rydberg,  Anahi PA Unavailable         Unavailable

 

                    Rydberg,  Anahi PA Unavailable         Unavailable

 

                    Rydberg,  Anahi PA Unavailable         Unavailable

 

                    RydbergAnahi PA Unavailable         Unavailable

 

                    Rydberg,  Anahi PA Unavailable         Unavailable

 

                    Rydberg,  Anahi PA Unavailable         Unavailable

 

                    Rydberg,  Anahi PA Unavailable         Unavailable



                                  



Re-disclosure Warning

    



Allergies and Adverse Reactions

          



           Type       Description Substance  Reaction   Status     Data Source(s

)

 

           Drug allergy MDX - Nka - No Known Allergies MDX - Nka - No Known Lance

rgies                       

[facility]



                                                                                
       



Encounters

          



           Encounter  Providers  Location   Date       Indications Data Source(s

)

 

           Outpatient            [facility] 2021 12:00:00 

AM EST            eCW1 ([facility])

 

           Outpatient Attender: GEORGE MADSEN NP            2021 11:

33:00 AM [facility]

 

           Outpatient Attender: GEORGE MADSEN NP            2020 01:

20:00 PM [facility]

 

           Outpatient            [facility] 12/10/2020 12:00:00 

AM EST            eCW1 ([facility])

 

           Outpatient Attender: GEORGE MADSEN NP            2020 09:

22:00 AM [facility]

 

           Outpatient Attender: GEORGE MADSEN NP            2020 11:

40:00 AM [facility]

 

           Outpatient Attender: GEORGE MADSEN NP            2020 10:

00:00 AM [facility]

 

           Outpatient Attender: MARIANNA OLIVAREZ SR            2020 10:36:00 

AM [facility]

 

                    Emergency           Attender: RUBY GRAYeferrer: MIKEL OLIVAREZ SR EMERGENCY ROOM-ER

                    10/27/2020 07:34:00 AM EDT - 10/27/2020 08:51:00 AM [facility]

 

                                        Patient discharged. 

 

           Outpatient Attender: GEORGE MADSEN NP            10/20/2020 11:

40:00 AM [facility]

 

                Outpatient      Attender: MARIANNA OLIVAREZ SR                 10/12

/2020 02:58:00 PM EDT - 10/28/2020 

10:36:00 AM [facility]

 

                                        Patient discharged. 

 

                Outpatient      Attender: MARIANNA OLIVAREZ SRReferrer: MARIANNA SUNG SR                 10/12/2020 

02:06:00 PM EDT - 10/12/2020 02:06:00 PM T                           [facility]

 

                    Outpatient          Attender: MARIANNA OLIVAREZ SRReferrer: MARYLOU DU 

EMERGENCY ROOM-[facility] 10/08/2020 12:43:00 PM EDT - 10/08/2020 12:43:00 PM [facility]

 

           Outpatient            [facility] 10/08/2020 12:00:00 

AM EDT            eCW1 ([facility])

 

                    Emergency           Attender: TYLER Johnsonerrer: MARYLOU MOSLEY 

EMERGENCY ROOM-ER   10/04/2020 05:42:00 AM EDT - 10/04/2020 06:05:00 AM [facility]

 

                                        Patient discharged. 

 

           Outpatient Attender: GEORGE MADSEN NP            10/01/2020 11:

40:00 AM [facility]

 

           Outpatient Attender: GEORGE MIKIRLAIS NP            2020 11:

40:00 AM [facility]

 

                          Emergency                 Attender: TOM AVINA

ttender: CHRISTOPHER SYMENOW PAReferrer: 

NICOLE DEL REAL, MSN  EMERGENCY ROOM-ER         2020 08:30:00 PM EDT -

 

2020 08:58:00 PM [facility]

 

                                        Patient discharged. 

 

                Outpatient      Attender: CHRISTOPHER SYMENOW PAConsultant: R[facility]

[facility]    

2020 07:55:00 PM [facility]

 

           Outpatient Attender: GEORGE MIKIRADITI NP            2020 11:

40:00 AM [facility]

 

           Outpatient Attender: GEORGE MIKIRLAIS NP            2020 11:

40:00 AM [facility]

 

           Outpatient Attender: GEORGE MIKIRLAIS NP            2020 01:

00:00 PM [facility]

 

           Outpatient            [facility] 2020 12:00:00 

AM [facility]            eCW1 ([facility])

 

           Outpatient Attender: THOMAS MORA PA-C            2020 11:22:00

 AM [facility]

 

           Outpatient Attender: GEORGE MIKIRADITI NP            2020 11:

00:00 AM [facility]

 

           Outpatient Attender: GEORGE MIKIRLAIS NP            2020 11:

20:00 AM [facility]

 

           Outpatient Attender: Breana Limon [facility]   2020 07:35:42 PM

 [facility]

 

           Outpatient Attender: Breana Limon [facility]   2020 12:10:22 AM

 [facility]

 

           Outpatient Attender: GEORGE NIKOLAIJARLAIS NP            2020 01:

00:00 PM [facility]

 

           Outpatient Attender: GEORGE NIKOLAIJARLAIS NP            2020 11:

00:00 AM [facility]

 

           Outpatient Attender: GEORGE NIKOLAIJARLAIS NP            2020 02:

18:00 PM [facility]

 

           Outpatient Attender: GEORGE NIKOLAIJARLAIS NP            2020 11:

00:00 AM [facility]

 

           Outpatient Attender: GEORGE MIKIRLAIS NP            03/10/2020 10:

40:00 AM [facility]

 

           Outpatient Attender: GEORGE MADSEN NP            2020 12:

59:00 PM [facility]

 

           Outpatient Attender: GEORGE CALE HARRIS            2020 10:

34:00 AM [facility]

 

           Outpatient Attender: GEORGE MADSEN NP            2019 02:

00:00 PM [facility]

 

           Outpatient Attender: GEORGE MADSEN NP            11/15/2019 01:

59:00 PM [facility]

 

                Emergency       Attender: RUBY ROSADO                  12:17:00 PM EST - 2015 

12:30:00 PM [facility]

 

                Emergency       Attender: TACHO KHAN MD                 

015 04:15:00 PM [facility] - 2015 

07:15:00 PM [facility]

 

                Inpatient       Attender: SCOTT MARIE PAAdmitter: JOS LARSON MD                 2014 

12:40:00 PM EST - 2014 01:22:00 AM [facility]

 

                Emergency       Attender: CAPRICE KASPER MD                  06:14:00 PM [facility] 2013

08:39:00 PM [facility]

 

                Outpatient      Attender: Anahi Silva PAReferrer: Anahi ROSADO                 2013 

02:32:00 PM [facility]



                                                                                
                                                                                
                                                                                
                                                                                
                                                                                
                                                                                
            



Medications

          



          Medication Brand Name Start Date Product Form Dose      Route     Admi

nistrative 

Instructions Pharmacy Instructions Status     Indications Reaction   Description

 Data 

Source(s)

 

          0.4 mg              2021 12:00:00 AM EST capsule   90           

       TAKE ONE CAPSULE BY MOUTH EVERY

DAY        TAKE ONE CAPSULE BY MOUTH EVERY DAY SOLD: 2021                 

                 [facility]

 

          0.5 mg              2021 12:00:00 AM EST tablet    5            

       TAKE ONE TABLET BY MOUTH EVERY 

DAY MAXIMUM DAILY DOSE = 1 TABLET       TAKE ONE TABLET BY MOUTH EVERY DAY MAXIM

UM 

DAILY DOSE = 1 TABLET SOLD: 2021                                        [facility]

 

          Lorazepam 0.5 MG Oral Tablet Lorazepam 0.5 MG 2021 12:00:00 AM E

ST                                

                      active                           Lorazepam 0.5 MG eCW1 ([facility])

 

                atorvastatin 20 MG Oral Tablet ATORVASTATIN CALCIUM 10/09/2020 1

2:00:00 AM EDT 

tablet          90                              TAKE ONE TABLET BY MOUTH EVERY D

AY TAKE ONE TABLET BY MOUTH EVERY 

DAY          SOLD: 10/10/2020                                        [facility]

s

 

                atorvastatin 20 MG Oral Tablet ATORVASTATIN CALCIUM 10/09/2020 1

2:00:00 AM EDT 

tablet          90                              TAKE ONE TABLET BY MOUTH EVERY D

AY TAKE ONE TABLET BY MOUTH EVERY 

DAY          SOLD: 2021                                        [facility]

s

 

       physical therapy eval and tx - UNK    10/08/2020 12:00:00 AM EDT         

                           active  

                                        physical therapy eval and tx - eCW1 ([facility])

 

       physical therapy eval and tx - UNK    10/08/2020 12:00:00 AM EDT         

                           active  

                                        physical therapy eval and tx - eCW1 ([facility])

 

       physical therapy eval and tx - UNK    10/08/2020 12:00:00 AM EDT         

                           active  

                                        physical therapy eval and tx - eCW1 ([facility])

 

       physical therapy eval and tx - UNK    10/08/2020 12:00:00 AM EDT         

                           active  

                                        physical therapy eval and tx - eCW1 ([facility])

 

                    atorvastatin 20 MG Oral Tablet [Lipitor] Lipitor 20 MG Lipit

or 20 MG       10/08/2020 

12:00:00 AM EDT        1.0 {tablet}                      active               Li

pitor 20 MG eCW1 ([facility])

 

       physical therapy eval and tx - UNK    10/08/2020 12:00:00 AM EDT         

                           active  

                                        physical therapy eval and tx - eCW1 ([facility])

 

                          Tamsulosin hydrochloride 0.4 MG Oral Capsule [Flomax] 

Flomax 0.4 MG Flomax 0.4 

MG    10/08/2020 12:00:00 AM EDT       1.0 {capsule}                   active   

          Flomax 0.4 MG 

eCW1 ([facility])

 

                    atorvastatin 20 MG Oral Tablet [Lipitor] Lipitor 20 MG Lipit

or 20 MG       10/08/2020 

12:00:00 AM EDT        1.0 {tablet}                      active               Li

pitor 20 MG eCW1 ([facility])

 

                          Tamsulosin hydrochloride 0.4 MG Oral Capsule [Flomax] 

Flomax 0.4 MG Flomax 0.4 

MG    10/08/2020 12:00:00 AM EDT       1.0 {capsule}                   active   

          Flomax 0.4 MG 

eCW1 ([facility])

 

       physical therapy eval and tx - UNK    10/08/2020 12:00:00 AM EDT         

                           active  

                                        physical therapy eval and tx - eCW1 ([facility])

 

                          Tamsulosin hydrochloride 0.4 MG Oral Capsule [Flomax] 

Flomax 0.4 MG Flomax 0.4 

MG    10/08/2020 12:00:00 AM EDT       1.0 {capsule}                   active   

          Flomax 0.4 MG 

eCW1 ([facility])

 

                    atorvastatin 20 MG Oral Tablet [Lipitor] Lipitor 20 MG Lipit

or 20 MG       10/08/2020 

12:00:00 AM EDT        1.0 {tablet}                      active               Li

pitor 20 MG eCW1 ([facility])

 

       physical therapy eval and tx - UNK    10/08/2020 12:00:00 AM EDT         

                           active  

                                        physical therapy eval and tx - eCW1 ([facility])

 

          100 mg              10/07/2020 12:00:00 AM EDT tablet    30           

       TAKE ONE TABLET BY MOUTH EVERY 

DAY        TAKE ONE TABLET BY MOUTH EVERY DAY SOLD: 12/10/2020                  

                [facility]

 

          100 mg              10/07/2020 12:00:00 AM EDT tablet    30           

       TAKE ONE TABLET BY MOUTH EVERY 

DAY        TAKE ONE TABLET BY MOUTH EVERY DAY SOLD: 10/08/2020                  

                [facility]

 

          100 mg              10/07/2020 12:00:00 AM EDT tablet    30           

       TAKE ONE TABLET BY MOUTH EVERY 

DAY        TAKE ONE TABLET BY MOUTH EVERY DAY SOLD: 2021                  

                [facility]

 

          100 mg              10/07/2020 12:00:00 AM EDT tablet    30           

       TAKE ONE TABLET BY MOUTH EVERY 

DAY        TAKE ONE TABLET BY MOUTH EVERY DAY SOLD: 2020                  

                [facility]

 

                quetiapine 100 MG Oral Tablet QUETIAPINE FUMARATE 10/05/2020 12:

00:00 AM EDT 

tablet          60                              TAKE TWO TABLETS BY MOUTH AT BED

TIME TAKE TWO TABLETS BY MOUTH AT 

BEDTIME      SOLD: 10/07/2020                                        [facility]

s

 

                quetiapine 100 MG Oral Tablet QUETIAPINE FUMARATE 10/05/2020 12:

00:00 AM EDT 

tablet          60                              TAKE TWO TABLETS BY MOUTH AT BED

TIME TAKE TWO TABLETS BY MOUTH AT 

BEDTIME      SOLD: 12/10/2020                                        [facility]

[facility]

 

                quetiapine 100 MG Oral Tablet QUETIAPINE FUMARATE 10/05/2020 12:

00:00 AM EDT 

tablet          60                              TAKE TWO TABLETS BY MOUTH AT BED

TIME TAKE TWO TABLETS BY MOUTH AT 

BEDTIME      SOLD: 2021                                        [facility]

[facility]

 

                quetiapine 100 MG Oral Tablet QUETIAPINE FUMARATE 10/05/2020 12:

00:00 AM EDT 

tablet          60                              TAKE TWO TABLETS BY MOUTH AT BED

TIME TAKE TWO TABLETS BY MOUTH AT 

BEDTIME      SOLD: 2020                                        [facility]

[facility]

 

          0.4 mg              10/04/2020 12:00:00 AM EDT capsule   14           

       TAKE ONE CAPSULE BY MOUTH EVERY

 DAY AS DIRECTED          TAKE ONE CAPSULE BY MOUTH EVERY DAY AS DIRECTED SOLD: 

10/04/2020                                                      [facility]

 

          500 mg              10/04/2020 12:00:00 AM EDT tablet    10           

       TAKE ONE TABLET BY MOUTH TWICE A

 DAY AS DIRECTED          TAKE ONE TABLET BY MOUTH TWICE A DAY AS DIRECTED SOLD:

 

10/04/2020                                                      [facility]

 

          25 mg               10/02/2020 12:00:00 AM EDT tablet    180          

       TAKE TWO TABLETS BY MOUTH EVERY 

8 HOURS    TAKE TWO TABLETS BY MOUTH EVERY 8 HOURS SOLD: 2021             

                     [facility]

 

          10 mg               10/02/2020 12:00:00 AM EDT tablet, sublingual 60  

                PLACE 1 TABLET UNDER 

THE TONGUE TWICE A DAY    PLACE 1 TABLET UNDER THE TONGUE TWICE A DAY SOLD: 

10/04/2020                                                      [facility]

 

          25 mg               10/02/2020 12:00:00 AM EDT tablet    180          

       TAKE TWO TABLETS BY MOUTH EVERY 

8 HOURS    TAKE TWO TABLETS BY MOUTH EVERY 8 HOURS SOLD: 2020             

                     [facility]

 

          25 mg               10/02/2020 12:00:00 AM EDT tablet    180          

       TAKE TWO TABLETS BY MOUTH EVERY 

8 HOURS    TAKE TWO TABLETS BY MOUTH EVERY 8 HOURS SOLD: 2020             

                     [facility]

 

          20 mg               10/02/2020 12:00:00 AM EDT tablet    90           

       TAKE ONE TABLET BY MOUTH THREE 

TIMES A DAY TAKE ONE TABLET BY MOUTH THREE TIMES A DAY SOLD: 2020         

                         

[facility]

 

          300 mg              10/02/2020 12:00:00 AM EDT tablet    30           

       TAKE ONE TABLET BY MOUTH EVERY 

EVENING    TAKE ONE TABLET BY MOUTH EVERY EVENING SOLD: 10/04/2020              

                    [facility]

 

          10 mg               10/02/2020 12:00:00 AM EDT tablet, sublingual 60  

                PLACE 1 TABLET UNDER 

THE TONGUE TWICE A DAY    PLACE 1 TABLET UNDER THE TONGUE TWICE A DAY SOLD: 

2020                                                      [facility]

 

          10 mg               10/02/2020 12:00:00 AM EDT tablet, sublingual 60  

                PLACE 1 TABLET UNDER 

THE TONGUE TWICE A DAY    PLACE 1 TABLET UNDER THE TONGUE TWICE A DAY SOLD: 

2020                                                      [facility]

 

          25 mg               10/02/2020 12:00:00 AM EDT tablet    180          

       TAKE TWO TABLETS BY MOUTH EVERY 

8 HOURS    TAKE TWO TABLETS BY MOUTH EVERY 8 HOURS SOLD: 10/04/2020             

                     [facility]

 

          10 mg               10/02/2020 12:00:00 AM EDT tablet, sublingual 60  

                PLACE 1 TABLET UNDER 

THE TONGUE TWICE A DAY    PLACE 1 TABLET UNDER THE TONGUE TWICE A DAY SOLD: 

2021                                                      [facility]

 

          300 mg              10/02/2020 12:00:00 AM EDT tablet    30           

       TAKE ONE TABLET BY MOUTH EVERY 

EVENING    TAKE ONE TABLET BY MOUTH EVERY EVENING SOLD: 2020              

                    [facility]

 

          20 mg               10/02/2020 12:00:00 AM EDT tablet    90           

       TAKE ONE TABLET BY MOUTH THREE 

TIMES A DAY TAKE ONE TABLET BY MOUTH THREE TIMES A DAY SOLD: 2020         

                         

[facility]

 

          300 mg              10/02/2020 12:00:00 AM EDT tablet    30           

       TAKE ONE TABLET BY MOUTH EVERY 

EVENING    TAKE ONE TABLET BY MOUTH EVERY EVENING SOLD: 2021              

                    [facility]

 

          20 mg               10/02/2020 12:00:00 AM EDT tablet    90           

       TAKE ONE TABLET BY MOUTH THREE 

TIMES A DAY TAKE ONE TABLET BY MOUTH THREE TIMES A DAY SOLD: 2021         

                         

[facility]

 

          20 mg               10/02/2020 12:00:00 AM EDT tablet    90           

       TAKE ONE TABLET BY MOUTH THREE 

TIMES A DAY TAKE ONE TABLET BY MOUTH THREE TIMES A DAY SOLD: 10/04/2020         

                         

[facility]

 

          300 mg              10/02/2020 12:00:00 AM EDT tablet    30           

       TAKE ONE TABLET BY MOUTH EVERY 

EVENING    TAKE ONE TABLET BY MOUTH EVERY EVENING SOLD: 2020              

                    [facility]

 

          100 mg              2020 12:00:00 AM EDT tablet    6            

       TAKE ONE TABLET BY MOUTH THREE 

TIMES A DAY TAKE ONE TABLET BY MOUTH THREE TIMES A DAY SOLD: 2020         

                         

[facility]

 

          300 mg              2020 12:00:00 AM EDT capsule   20           

       TAKE ONE CAPSULE BY MOUTH TWICE

 A DAY     TAKE ONE CAPSULE BY MOUTH TWICE A DAY SOLD: 2020               

                   [facility]

 

                    pantoprazole 40 MG Delayed Release Oral Tablet PANTOPRAZOLE 

SODIUM 2020 

12:00:00 AM EDT tablet,delayed release (DR/EC) 30                              T

RONNY ONE TABLET BY MOUTH 

EVERY DAY  TAKE ONE TABLET BY MOUTH EVERY DAY SOLD: 2021                  

                [facility]

 

          40 mg               2020 12:00:00 AM EDT tablet,delayed release 

(DR/EC) 30                  TAKE ONE 

TABLET BY MOUTH EVERY DAY TAKE ONE TABLET BY MOUTH EVERY DAY SOLD: 2020   

              

                                                    [facility]

 

                    pantoprazole 40 MG Delayed Release Oral Tablet PANTOPRAZOLE 

SODIUM 2020 

12:00:00 AM EDT tablet,delayed release (DR/EC) 30                              T

RONNY ONE TABLET BY MOUTH 

EVERY DAY  TAKE ONE TABLET BY MOUTH EVERY DAY SOLD: 12/10/2020                  

                [facility]

 

                    pantoprazole 40 MG Delayed Release Oral Tablet PANTOPRAZOLE 

SODIUM 2020 

12:00:00 AM EDT tablet,delayed release (DR/EC) 30                              T

RONNY ONE TABLET BY MOUTH 

EVERY DAY  TAKE ONE TABLET BY MOUTH EVERY DAY SOLD: 2020                  

                [facility]

 

                    pantoprazole 40 MG Delayed Release Oral Tablet PANTOPRAZOLE 

SODIUM 2020 

12:00:00 AM EDT tablet,delayed release (DR/EC) 30                              T

RONNY ONE TABLET BY MOUTH 

EVERY DAY  TAKE ONE TABLET BY MOUTH EVERY DAY SOLD: 10/08/2020                  

                [facility]

 

          20 mg               2020 12:00:00 AM EDT tablet    60           

       TAKE ONE TABLET BY MOUTH TWICE A 

DAY        TAKE ONE TABLET BY MOUTH TWICE A DAY SOLD: 2020                

                  [facility]

 

                          Propranolol Hydrochloride 20 MG Oral Tablet Propranolo

l HCl 20 MG Propranolol 

HCl 20 MG 2020 12:00:00 AM EDT        1.0 {tablet}                      ac

tive               Propranolol

 HCl 20 MG                              eCW1 ([facility]

[facility])

 

                          Propranolol Hydrochloride 20 MG Oral Tablet Propranolo

l HCl 20 MG Propranolol 

HCl 20 MG 2020 12:00:00 AM EDT        1.0 {tablet}                      ac

tive               Propranolol

 HCl 20 MG                              [facility]

 

                          Propranolol Hydrochloride 20 MG Oral Tablet Propranolo

l HCl 20 MG Propranolol 

HCl 20 MG 2020 12:00:00 AM EDT        1.0 {tablet}                      ac

tive               Propranolol

 HCl 20 MG                              eCW1 ([facility]

[facility])

 

                          Propranolol Hydrochloride 20 MG Oral Tablet Propranolo

l HCl 20 MG Propranolol 

HCl 20 MG 2020 12:00:00 AM EDT        1.0 {tablet}                      ac

tive               Propranolol

 HCl 20 MG                              [facility]

 

          5 mg                2020 12:00:00 AM EDT tablet    60           

       TAKE ONE TABLET BY MOUTH TWO TIMES

 A DAY AS NEEDED MAXIMUM DAILY DOSE = 2 TABLETS TAKE ONE TABLET BY MOUTH TWO 

TIMES A DAY AS NEEDED MAXIMUM DAILY DOSE = 2 TABLETS SOLD: 2020           

                             

[facility]

 

          100 mg              2020 12:00:00 AM EDT tablet    30           

       TAKE ONE TABLET BY MOUTH EVERY 

DAY        TAKE ONE TABLET BY MOUTH EVERY DAY SOLD: 2020                  

                [facility]

 

          100 mg              2020 12:00:00 AM EDT tablet    30           

       TAKE ONE TABLET BY MOUTH EVERY 

DAY        TAKE ONE TABLET BY MOUTH EVERY DAY SOLD: 2020                  

                [facility]

 

          100 mg              2020 12:00:00 AM EDT tablet    30           

       TAKE ONE TABLET BY MOUTH EVERY 

DAY        TAKE ONE TABLET BY MOUTH EVERY DAY SOLD: 2020                  

                [facility]

 

                quetiapine 100 MG Oral Tablet QUETIAPINE FUMARATE 2020 12:

00:00 AM EDT 

tablet          60                              TAKE TWO TABLETS BY MOUTH AT BED

TIME TAKE TWO TABLETS BY MOUTH AT 

BEDTIME      SOLD: 2020                                        [facility]

[facility]

 

                quetiapine 100 MG Oral Tablet QUETIAPINE FUMARATE 2020 12:

00:00 AM EDT 

tablet          60                              TAKE TWO TABLETS BY MOUTH AT BED

TIME TAKE TWO TABLETS BY MOUTH AT 

BEDTIME      SOLD: 2020                                        [facility]

[facility]

 

                quetiapine 100 MG Oral Tablet QUETIAPINE FUMARATE 2020 12:

00:00 AM EDT 

tablet          60                              TAKE TWO TABLETS BY MOUTH AT BED

TIME TAKE TWO TABLETS BY MOUTH AT 

BEDTIME      SOLD: 09/10/2020                                        [facility]

[facility]

 

                quetiapine 100 MG Oral Tablet QUETIAPINE FUMARATE 2020 12:

00:00 AM EDT 

tablet          60                              TAKE TWO TABLETS BY MOUTH AT BED

TIME TAKE TWO TABLETS BY MOUTH AT 

BEDTIME      SOLD: 2020                                        [facility]

[facility]

 

          10 mg               2020 12:00:00 AM EDT tablet, sublingual 60  

                PLACE 1 TABLET UNDER 

THE TONGUE TWICE A DAY    PLACE 1 TABLET UNDER THE TONGUE TWICE A DAY SOLD: 

2020                                                      [facility]

 

          10 mg               2020 12:00:00 AM EDT tablet, sublingual 60  

                PLACE 1 TABLET UNDER 

THE TONGUE TWICE A DAY    PLACE 1 TABLET UNDER THE TONGUE TWICE A DAY SOLD: 

2020                                                      [facility]

 

          10 mg               2020 12:00:00 AM EDT tablet, sublingual 60  

                PLACE 1 TABLET UNDER 

THE TONGUE TWICE A DAY    PLACE 1 TABLET UNDER THE TONGUE TWICE A DAY SOLD: 

2020                                                      [facility]

 

          10 mg               2020 12:00:00 AM EDT tablet, sublingual 60  

                PLACE 1 TABLET UNDER 

THE TONGUE TWICE A DAY    PLACE 1 TABLET UNDER THE TONGUE TWICE A DAY SOLD: 

2020                                                      [facility]

 

          300 mg              2020 12:00:00 AM EDT tablet    30           

       TAKE ONE TABLET BY MOUTH EVERY 

EVENING    TAKE ONE TABLET BY MOUTH EVERY EVENING SOLD: 2020              

                    [facility]

 

          300 mg              2020 12:00:00 AM EDT tablet    30           

       TAKE ONE TABLET BY MOUTH EVERY 

EVENING    TAKE ONE TABLET BY MOUTH EVERY EVENING SOLD: 2020              

                    [facility]

 

          300 mg              2020 12:00:00 AM EDT tablet    30           

       TAKE ONE TABLET BY MOUTH EVERY 

EVENING    TAKE ONE TABLET BY MOUTH EVERY EVENING SOLD: 2020              

                    [facility]

 

          300 mg              2020 12:00:00 AM EDT tablet    30           

       TAKE ONE TABLET BY MOUTH EVERY 

EVENING    TAKE ONE TABLET BY MOUTH EVERY EVENING SOLD: 2020              

                    [facility]

 

          5 mg                2020 12:00:00 AM EDT tablet    30           

       TAKE 1 TABLET BY MOUTH TWICE AS 

NEEDED MAXIMUM DAILY DOSE = 2 TABLETS   TAKE 1 TABLET BY MOUTH TWICE AS NEEDED 

MAXIMUM DAILY DOSE = 2 TABLETS SOLD: 2020                                 

       [facility]

 

          0.25 mg             2020 12:00:00 AM EDT tablet    30           

       TAKE 1 TABLET EVERY DAY AT 

BEDTIME MAXIMUM DAILY DOSE = 1 TABLET   TAKE 1 TABLET EVERY DAY AT BEDTIME MAXIM

UM

 DAILY DOSE = 1 TABLET SOLD: 2020                                        [facility]

 

          40 mg               2020 12:00:00 AM EDT tablet,delayed release 

(DR/EC) 30                  TAKE ONE 

TABLET BY MOUTH EVERY DAY TAKE ONE TABLET BY MOUTH EVERY DAY SOLD: 2020   

              

                                                    [facility]

 

          40 mg               2020 12:00:00 AM EDT tablet,delayed release 

(DR/EC) 30                  TAKE ONE 

TABLET BY MOUTH EVERY DAY TAKE ONE TABLET BY MOUTH EVERY DAY SOLD: 2020   

              

                                                    [facility]

 

          40 mg               2020 12:00:00 AM EDT tablet,delayed release 

(DR/EC) 30                  TAKE ONE 

TABLET BY MOUTH EVERY DAY TAKE ONE TABLET BY MOUTH EVERY DAY SOLD: 2020   

              

                                                    [facility]

 

          40 mg               2020 12:00:00 AM EDT tablet,delayed release 

(DR/EC) 30                  TAKE ONE 

TABLET BY MOUTH EVERY DAY TAKE ONE TABLET BY MOUTH EVERY DAY SOLD: 2020   

              

                                                    [facility]

 

          40 mg               2020 12:00:00 AM EDT tablet,delayed release 

(DR/EC) 30                  TAKE ONE 

TABLET BY MOUTH EVERY DAY TAKE ONE TABLET BY MOUTH EVERY DAY SOLD: 2020   

              

                                                    [facility]

 

          100 mg              2020 12:00:00 AM EDT tablet    60           

       TAKE TWO TABLETS BY MOUTH ONCE 

DAILY      TAKE TWO TABLETS BY MOUTH ONCE DAILY SOLD: 2020                

                  [facility]

 

          100 mg              2020 12:00:00 AM EDT tablet    60           

       TAKE TWO TABLETS BY MOUTH ONCE 

DAILY      TAKE TWO TABLETS BY MOUTH ONCE DAILY SOLD: 2020                

                  [facility]

 

          100 mg              2020 12:00:00 AM EDT tablet    60           

       TAKE TWO TABLETS BY MOUTH ONCE 

DAILY      TAKE TWO TABLETS BY MOUTH ONCE DAILY SOLD: 2020                

                  [facility]

 

          100 mg              2020 12:00:00 AM EDT tablet    60           

       TAKE TWO TABLETS BY MOUTH ONCE 

DAILY      TAKE TWO TABLETS BY MOUTH ONCE DAILY SOLD: 2020                

                  [facility]

 

          0.25 mg             2020 12:00:00 AM EDT tablet    30           

       TAKE 1 TABLET BY MOUTH DAILY AT

 BEDTIME MAXIMUM DAILY DOSE = 1 TABLET  TAKE 1 TABLET BY MOUTH DAILY AT BEDTIME 

MAXIMUM DAILY DOSE = 1 TABLET SOLD: 2020                                  

      [facility]

 

          90 mcg/actuation           2020 12:00:00 AM EDT HFA aerosol inha

ler 18                  INHALE 

TWO PUFFS BY MOUTH EVERY 4 HOURS AS NEEDED INHALE TWO PUFFS BY MOUTH EVERY 4 

HOURS AS NEEDED SOLD: 2020                                        [facility]

 

          90 mcg/actuation           2020 12:00:00 AM EDT HFA aerosol inha

ler 18                  INHALE 

TWO PUFFS BY MOUTH EVERY 4 HOURS AS NEEDED INHALE TWO PUFFS BY MOUTH EVERY 4 

HOURS AS NEEDED SOLD: 2020                                        [facility]

 

          90 mcg/actuation           2020 12:00:00 AM EDT HFA aerosol inha

ler 18                  INHALE 

TWO PUFFS BY MOUTH EVERY 4 HOURS AS NEEDED INHALE TWO PUFFS BY MOUTH EVERY 4 

HOURS AS NEEDED SOLD: 2020                                        [facility]

 

          0.25 mg             2020 12:00:00 AM EDT tablet    15           

       TAKE ONE TABLET BY MOUTH DAILY 

AT BEDTIME FOR 15 DAYS MAXIMUM DAILY DOSE = 1 TABLET TAKE ONE TABLET BY MOUTH 

DAILY AT BEDTIME FOR 15 DAYS MAXIMUM DAILY DOSE = 1 TABLET SOLD: 2020     

                            

                                        [email or web address]

 

                    Triazolam 0.25 MG Oral Tablet [Halcion] Halcion 0.25 MG Halc

ion 0.25 MG     

03/10/2020 12:00:00 AM EDT        1.0 {tablet}                      active      

         Halcion 0.25 MG eCW1 

([facility])

 

                    Triazolam 0.25 MG Oral Tablet [Halcion] Halcion 0.25 MG Halc

ion 0.25 MG     

03/10/2020 12:00:00 AM EDT        1.0 {tablet}                      active      

         Halcion 0.25 MG eCW1 

([facility])

 

                    Triazolam 0.25 MG Oral Tablet [Halcion] Halcion 0.25 MG Halc

ion 0.25 MG     

03/10/2020 12:00:00 AM EDT        1.0 {tablet}                      active      

         Halcion 0.25 MG eCW1 

([facility])

 

                    Triazolam 0.25 MG Oral Tablet [Halcion] Halcion 0.25 MG Halc

ion 0.25 MG     

03/10/2020 12:00:00 AM EDT        1.0 {tablet}                      active      

         Halcion 0.25 MG eCW1 

([facility])

 

          40 mg               2020 12:00:00 AM EST tablet,delayed release 

(DR/EC) 30                  TAKE 1 

TABLET BY MOUTH ONCE A DAY TAKE 1 TABLET BY MOUTH ONCE A DAY SOLD: 2020   

              

                                                    [email or web address]

 

          40 mg               2020 12:00:00 AM EST tablet,delayed release 

(DR/EC) 30                  TAKE 1 

TABLET BY MOUTH ONCE A DAY TAKE 1 TABLET BY MOUTH ONCE A DAY SOLD: 2020   

              

                                                    [facility]

 

          10 mg               2020 12:00:00 AM EST tablet, sublingual 60  

                PLACE ONE TABLET 

UNDER THE TONGUE AND ALLOW TO DISSOLVE TWICE A DAY PLACE ONE TABLET UNDER THE 

TONGUE AND ALLOW TO DISSOLVE TWICE A DAY SOLD: 2020                       

                 [facility]

 

          25 mg               2020 12:00:00 AM EST tablet    180          

       TAKE TWO TABLETS BY MOUTH EVERY 

8 HOURS    TAKE TWO TABLETS BY MOUTH EVERY 8 HOURS SOLD: 2020             

                     [facility]

 

                quetiapine 100 MG Oral Tablet QUETIAPINE FUMARATE 2020 12:

00:00 AM EST 

tablet          60                              TAKE TWO TABLETS BY MOUTH DAILY 

AT BEDTIME TAKE TWO TABLETS BY MOUTH

 DAILY AT BEDTIME SOLD: 2020                                        [facility]

 

                quetiapine 100 MG Oral Tablet QUETIAPINE FUMARATE 2020 12:

00:00 AM EST 

tablet          60                              TAKE TWO TABLETS BY MOUTH DAILY 

AT BEDTIME TAKE TWO TABLETS BY MOUTH

 DAILY AT BEDTIME SOLD: 2020                                        [facility]

 

                quetiapine 100 MG Oral Tablet QUETIAPINE FUMARATE 2020 12:

00:00 AM EST 

tablet          60                              TAKE TWO TABLETS BY MOUTH DAILY 

AT BEDTIME TAKE TWO TABLETS BY MOUTH

 DAILY AT BEDTIME SOLD: 2020                                        [facility]

 

          100 mg              2020 12:00:00 AM EST tablet    60           

       TAKE TWO TABLETS BY MOUTH ONCE 

DAILY      TAKE TWO TABLETS BY MOUTH ONCE DAILY SOLD: 2020                

                  [facility]

 

          10 mg               2020 12:00:00 AM EST tablet, sublingual 60  

                PLACE ONE TABLET 

UNDER THE TONGUE AND ALLOW TO DISSOLVE TWICE A DAY PLACE ONE TABLET UNDER THE 

TONGUE AND ALLOW TO DISSOLVE TWICE A DAY SOLD: 2020                       

                 [facility]

 

          10 mg               2020 12:00:00 AM EST tablet, sublingual 60  

                PLACE ONE TABLET 

UNDER THE TONGUE AND ALLOW TO DISSOLVE TWICE A DAY PLACE ONE TABLET UNDER THE 

TONGUE AND ALLOW TO DISSOLVE TWICE A DAY SOLD: 2020                       

                 [facility]

 

          25 mg               2020 12:00:00 AM EST tablet    180          

       TAKE TWO TABLETS BY MOUTH EVERY 

8 HOURS    TAKE TWO TABLETS BY MOUTH EVERY 8 HOURS SOLD: 2020             

                     [facility]

 

          100 mg              2020 12:00:00 AM EST tablet    60           

       TAKE TWO TABLETS BY MOUTH ONCE 

DAILY      TAKE TWO TABLETS BY MOUTH ONCE DAILY SOLD: 2020                

                  [facility]

 

                quetiapine 100 MG Oral Tablet QUETIAPINE FUMARATE 2020 12:

00:00 AM EST 

tablet          60                              TAKE TWO TABLETS BY MOUTH DAILY 

AT BEDTIME TAKE TWO TABLETS BY MOUTH

 DAILY AT BEDTIME SOLD: 2020                                        [facility]

 

                          Hydroxyzine Hydrochloride 25 MG Oral Tablet HydrOXYzin

e HCl 25 MG HydrOXYzine 

HCl 25 MG 2020 12:00:00 AM EST                               active       

      HydrOXYzine HCl 25 MG 

eCW1 ([facility])

 

                          Hydroxyzine Hydrochloride 25 MG Oral Tablet HydrOXYzin

e HCl 25 MG HydrOXYzine 

HCl 25 MG 2020 12:00:00 AM EST                               active       

      HydrOXYzine HCl 25 MG 

eCW1 ([facility])

 

                          Hydroxyzine Hydrochloride 25 MG Oral Tablet HydrOXYzin

e HCl 25 MG HydrOXYzine 

HCl 25 MG 2020 12:00:00 AM EST                               active       

      HydrOXYzine HCl 25 MG 

eCW1 ([facility])

 

                          Hydroxyzine Hydrochloride 25 MG Oral Tablet HydrOXYzin

e HCl 25 MG HydrOXYzine 

HCl 25 MG 2020 12:00:00 AM EST                               active       

      HydrOXYzine HCl 25 MG 

eCW1 ([facility])

 

          300 mg              2020 12:00:00 AM EST tablet    30           

       TAKE ONE TABLET BY MOUTH EVERY 

DAY IN THE EVENING        TAKE ONE TABLET BY MOUTH EVERY DAY IN THE EVENING SOLD

: 

2020                                                      [facility]

 

          300 mg              2020 12:00:00 AM EST tablet    30           

       TAKE ONE TABLET BY MOUTH EVERY 

DAY IN THE EVENING        TAKE ONE TABLET BY MOUTH EVERY DAY IN THE EVENING SOLD

: 

2020                                                      [facility]

 

          300 mg              2020 12:00:00 AM EST tablet    30           

       TAKE ONE TABLET BY MOUTH EVERY 

DAY IN THE EVENING        TAKE ONE TABLET BY MOUTH EVERY DAY IN THE EVENING SOLD

: 

2020                                                      [facility]

 

          300 mg              2020 12:00:00 AM EST tablet    30           

       TAKE ONE TABLET BY MOUTH EVERY 

DAY IN THE EVENING        TAKE ONE TABLET BY MOUTH EVERY DAY IN THE EVENING SOLD

: 

2020                                                      [facility]

 

          40 mg               2019 12:00:00 AM EST tablet,delayed release 

(DR/EC) 30                  TAKE 1 

TABLET BY MOUTH ONCE A DAY TAKE 1 TABLET BY MOUTH ONCE A DAY SOLD: 2019   

              

                                                    [facility]

 

          40 mg               2019 12:00:00 AM EST tablet,delayed release 

(DR/EC) 30                  TAKE 1 

TABLET BY MOUTH ONCE A DAY TAKE 1 TABLET BY MOUTH ONCE A DAY SOLD: 2020   

              

                                                    [facility]

 

          90 mcg/actuation           2019 12:00:00 AM EST HFA aerosol inha

ler 18                  INHALE 

TWO PUFFS BY MOUTH EVERY 4 HOURS AS NEEDED INHALE TWO PUFFS BY MOUTH EVERY 4 

HOURS AS NEEDED SOLD: 2020                                        [facility]

 

          90 mcg/actuation           2019 12:00:00 AM EST HFA aerosol inha

ler 18                  INHALE 

TWO PUFFS BY MOUTH EVERY 4 HOURS AS NEEDED INHALE TWO PUFFS BY MOUTH EVERY 4 

HOURS AS NEEDED SOLD: 2020                                        [facility]

 

          90 mcg/actuation           2019 12:00:00 AM EST HFA aerosol inha

ler 18                  INHALE 

TWO PUFFS BY MOUTH EVERY 4 HOURS AS NEEDED INHALE TWO PUFFS BY MOUTH EVERY 4 

HOURS AS NEEDED SOLD: 2020                                        [facility]

 

          90 mcg/actuation           2019 12:00:00 AM EST HFA aerosol inha

ler 18                  INHALE 

TWO PUFFS BY MOUTH EVERY 4 HOURS AS NEEDED INHALE TWO PUFFS BY MOUTH EVERY 4 

HOURS AS NEEDED SOLD: 2019                                        [facility]

 

          90 mcg/actuation           2019 12:00:00 AM EST HFA aerosol inha

ler 18                  INHALE 

TWO PUFFS BY MOUTH EVERY 4 HOURS AS NEEDED INHALE TWO PUFFS BY MOUTH EVERY 4 

HOURS AS NEEDED SOLD: 2020                                        [facility]

 

          90 mcg/actuation           2019 12:00:00 AM EST HFA aerosol inha

ler 18                  INHALE 

TWO PUFFS BY MOUTH EVERY 4 HOURS AS NEEDED INHALE TWO PUFFS BY MOUTH EVERY 4 

HOURS AS NEEDED SOLD: 2020                                        [facility]

 

          100 mg              11/10/2019 12:00:00 AM EST tablet    30           

       TAKE ONE TABLET BY MOUTH EVERY 

DAY        TAKE ONE TABLET BY MOUTH EVERY DAY SOLD: 2020                  

                [facility]

 

                quetiapine 100 MG Oral Tablet QUETIAPINE FUMARATE 11/10/2019 12:

00:00 AM EST 

tablet          60                              TAKE TWO TABLETS BY MOUTH AT BED

TIME TAKE TWO TABLETS BY MOUTH AT 

BEDTIME      SOLD: 2019                                        [facility]

[facility]

 

                quetiapine 100 MG Oral Tablet QUETIAPINE FUMARATE 11/10/2019 12:

00:00 AM EST 

tablet          60                              TAKE TWO TABLETS BY MOUTH AT BED

TIME TAKE TWO TABLETS BY MOUTH AT 

BEDTIME      SOLD: 2020                                        [facility]

 

          100 mg              11/10/2019 12:00:00 AM EST tablet    30           

       TAKE ONE TABLET BY MOUTH EVERY 

DAY        TAKE ONE TABLET BY MOUTH EVERY DAY SOLD: 2019                  

                [facility]

 

          10 mg               10/16/2019 12:00:00 AM EDT tablet, sublingual 60  

                PLACE ONE TABLET 

UNDER THE TONGUE AND DISSOLVE TWICE A DAY PLACE ONE TABLET UNDER THE TONGUE AND 

DISSOLVE TWICE A DAY SOLD: 2019                                        [facility]

 

          10 mg               10/16/2019 12:00:00 AM EDT tablet, sublingual 60  

                PLACE ONE TABLET 

UNDER THE TONGUE AND DISSOLVE TWICE A DAY PLACE ONE TABLET UNDER THE TONGUE AND 

DISSOLVE TWICE A DAY SOLD: 2020                                        [facility]

 

          300 mg              2019 12:00:00 AM EDT tablet    30           

       TAKE ONE TABLET BY MOUTH DAILY 

EVERY EVENING TAKE ONE TABLET BY MOUTH DAILY EVERY EVENING SOLD: 2019     

                      

                                        [facility]



                                                                                
                                                                                
                                                                                
                                                                                
                                                                                
                                                                                
                                                                                
                                                                                
                                                                                
                                                                                
                                                                                
                                                                                
                                                                                
                                                                                
                                                                                
                                                                                
                                                          



Insurance Providers

          



             Payer name   Policy type / Coverage type Policy ID    Covered party

 ID Covered 

party's relationship to borrego Policy Bor[employer]             Plan Information

 

          [facility]           [id number]           SP           

       [id number]

 

          [facility]            [id number]           S                   [id number]

 

          [place] [facility]           [id number]           S            

       [id number]

 

          [place] [facility]           [id number]           S            

       [id number]

 

          [facility] MEDICAID           [id number]           S            

       [id number]

 

          [facility]           [id number]           S     

              [id number]

 

          MEDICAID            [id number]            S                   [id number]

 

          Medicaid  P         [id number]            S                   [id number]

 

          [facility] access hospital            [id number]           S                   [id number]

 

          [facility] MEDICAID           [id number]           S            

       [id number]

 

                    [employer]icaid [id number]          

                     

[id number]

 

                    [employer] [id number]          

                     

[id number]

 

                    [employer] [id number]        

                       

[id number]

 

                    [employer] [id number]          

                     

[id number]

 

          [employer]            [id number]           S                   [id number]

 

                    [employer]-Medicaid [id number]          

                     

[id number]

 

                    [employer] [id number]        

                       

[id number]

 

          [employer] MEDICAID           [id number]           S            

       [id number]

 

          UN FB            [id number]           S                   [id number]

 

                    [employer] [id number]        

                       

[id number]

 

                    ANS-Medicaid [id number]          

                     

[id number]

 

          [facility]   [id number]           S            

       [id number]

 

          [facility]HC COMMUNITY PLAN [facility]           [id number]           SP           

       [id number]

 

          [facility]   [id number]           S                   [id number]

 

          [facility](MCA[facility]) O         [id number]           C              

     [id number]

 

          [facility]ICAID [facility]   [id number]           S            

       [id number]

 

          BLUE CROSS CASTRO PLAN           [id number]           SP            

      [id number]

 

          [place]        [id number]           S            

       [id number]

 

          BCBS HMO BLUE BC        [id number]           S                   [id number]

[id number]

 

          BLUE CROSS CASTRO PLAN           [id number]            SP               

   [id number]

 

          [facility] P         UNAVAILABLE           C             

      UNAVAILABLE

 

          SELF PAY            UNAVAILABLE                               UNAVAILA

BLE

 

                                                                       



                                                                                
                                                                                
                                                                                
                                                                                
                                                                                
        



Problems, Conditions, and Diagnoses

          



           Code       Display Name Description Problem Type Effective Dates Data

 Source(s)

 

                    N40.0               [id number]           Benign prostatic hyp

erplasia, unspecified whether lower urinary 

tract symptoms present Problem             10/08/2020 12:00:00 AM EDT eCW1 ([facility])

 

                    E66.09              [id number]           Class 2 obesity due 

to excess calories without serious 

comorbidity in adult, unspecified BMI Problem             10/08/2020 12:00:00 AM

 EDT eCW1 

([facility])

 

             M54.31       [id number] Sciatica, right side Problem      10

/2020 12:00:00 AM EDT

                                        eCW1 ([facility]

[facility])

 

           M54.32     [id number]   Sciatica, left side Problem    10/08/2020 12:00

:00 AM EDT eCW1 

([facility])

 

                F43.23          [id number]       Adjustment disorder with mixed a

nxiety and depressed mood 

Problem                   2020 12:00:00 AM EDT eC1 ([facility])

 

                    F43.23              Adjustment disorder with mixed anxiety a

nd depressed mood ADJUSTMENT 

DISORDER WITH MIXED ANXIETY AND DEPRESS Diagnosis           2020 01:20:00 

PM [facility]

 

                    F43.10              Post-traumatic stress disorder, unspecif

ied POST-TRAUMATIC STRESS 

DISORDER, UNSPECIFIED Diagnosis           2020 01:20:00 PM [facility]

sarthak

 

                    F41.0               Panic disorder [episodic paroxysmal anxi

ety] PANIC DISORDER [EPISODIC 

PAROXYSMAL ANXIETY] Diagnosis           2020 01:20:00 PM [facility]

l

 

             F60.3        Borderline personality disorder BORDERLINE PERSONALITY

 DISORDER Diagnosis    

2020 09:22:00 AM [facility]

 

                F41.8           Other specified anxiety disorders OTHER SPECIFIE

D ANXIETY DISORDERS 

Diagnosis                 2020 11:40:00 AM [facility]

 

                    Z90.49              Acquired absence of other specified part

s of digestive tract ACQUIRED 

ABSENCE OF OTHER SPECIFIED PARTS OF DIGES Diagnosis           10/27/2020 07:34:0

0 AM [facility]

 

                    Z79.899             Other long term (current) drug therapy O

THER LONG TERM (CURRENT) DRUG 

THERAPY             Diagnosis           10/27/2020 07:34:00 AM [facility]

l

 

             R35.0        Frequency of micturition FREQUENCY OF MICTURITION Diag

nosis    10/27/2020 

07:34:00 AM [facility]

 

             M54.32       Sciatica, left side SCIATICA, LEFT SIDE Diagnosis    1

 02:37:00 PM 

[facility]

 

                    M51.34              Other intervertebral disc degeneration, 

thoracic region OTHER 

INTERVERTEBRAL DISC DEGENERATION, THORACIC R Diagnosis                 10/12/202

0 02:06:00 PM 

[facility]

 

             M54.31       Sciatica, right side SCIATICA, RIGHT SIDE Diagnosis   

 10/12/2020 02:06:00 

PM [facility]

 

             M25.559      Pain in unspecified hip PAIN IN UNSPECIFIED HIP Diagno

sis    10/12/2020 

02:06:00 PM [facility]

 

                    E66.09              Other obesity due to excess calories OTH

ER OBESITY DUE TO EXCESS CALORIES

                    Diagnosis           10/08/2020 12:43:00 PM [facility]

 

                    N40.0               Benign prostatic hyperplasia without low

er urinary tract symptoms BENIGN 

PROSTATIC HYPERPLASIA WITHOUT LOWER URINRY Diagnosis           10/08/2020 12:43:

00 PM [facility]

 

           M25.552    Pain in left hip PAIN IN LEFT HIP Diagnosis  10/08/2020 12

:43:00 PM [facility]

 

             M25.551      Pain in right hip PAIN IN RIGHT HIP Diagnosis    10/08

/2020 12:43:00 PM [facility]

 

                    Z13.220             Encounter for screening for lipoid disor

ders ENCOUNTER FOR SCREENING FOR

LIPOID DISOR        Diagnosis           10/08/2020 12:43:00 PM [facility]

 

                N30.00          Acute cystitis without hematuria ACUTE CYSTITIS 

WITHOUT HEMATURIA 

Diagnosis                 10/08/2020 12:43:00 PM [facility]

 

                    Z87.440             Personal history of urinary (tract) infe

ctions PERSONAL HISTORY OF 

URINARY (TRACT) INFECTIONS Diagnosis           10/04/2020 05:42:00 AM [facility]

 

                    N40.1               Benign prostatic hyperplasia with lower 

urinary tract symptoms BENIGN 

PROSTATIC HYPERPLASIA WITH LOWER URINARY TR Diagnosis                 10/04/2020

 05:42:00 AM [facility]

 

             F41.9        Anxiety disorder, unspecified ANXIETY DISORDER, UNSPEC

IFIED Diagnosis    

2020 11:40:00 AM [facility]

 

           R30.0      Dysuria    DYSURIA    Diagnosis  2020 08:30:00 PM [facility]

 

                          Z53.21                    Procedure and treatment not 

carried out due to patient leaving prior to 

being seen by health care provider      PROC/TRTMT NOT CRD OUT D/T PT LV BEF SEE

N BY 

[facility] CARE PROV      Diagnosis           2020 11:22:00 AM [facility]

 

             R63.4        Abnormal weight loss ABNORMAL WEIGHT LOSS Diagnosis   

 2020 02:18:00 PM

[facility]



                                                                                
                                                                                
                                                                                
                                                                                
                                            



Results

          



                    ID                  Date                Data Source

 

                    [id number]       10/27/2020 09:18:00 AM [facility]

 

                                          Patient: ANITHA GOEL Observation

 Report - Physicians/Mid Levels MRN: 

[id number]Visit[facility]: [id number] [place] [address] [phone], MRegistration Date/Time: 10/27/2020 06:49 Weight:113.3 kg
(S). Height/Length:72 inches (S). BMI:33.9        FAMILY HISTORYNo significant 
family medical history.        (Electronically signed by RICCARDO Hughes 
10/27/2020 08:50)   









          Name      Value     Range     Interpretation Code Description Data Janice

rce(s) Supporting 

Document(s)

 

                                                                       









                    ID                  Date                Data Source

 

                    [id number] 10/27/2020 08:41:00 AM [facility]









          Name      Value     Range     Interpretation Code Description Data [facility]

rce(s) Supporting 

Document(s)

 

          PSA SCREENING 3.3 ng/mL 0.0-4.0                       [facility]  

 

                                        THIS ASSAY WAS PERFORMED ON THE SIEMENS 

DIMENSION EXL USINGTHE B-

GALACTOSIDASE/CRPG METHODOLOGY. THE PSA IS NOT AN ABSOLUTE TEST FOR MALIGNANCY. 
IT SHOULD BEUSED IN CONJUNCTION WITH INFORMATION AVAILABLE FROM THECLINICAL 
EVALUATION AND OTHER DIAGNOSTIC PROCEDURES. VALUES OBTAINED WITH DIFFERENT ASSAY
METHODS CANNOT BE USEDINTERCHANGEABLY. 









                    ID                  Date                Data Source

 

                    [id number]    10/27/2020 08:25:00 AM [facility]









          Name      Value     Range     Interpretation Code Description Data [facility]

rce(s) Supporting 

Document(s)

 

          GLUCOSE   109 mg/dL     H                   [facility]  

 

          BLOOD UREA NITROGEN 15 mg/dL  7-18                          [facility]

ital  

 

          CREATININE 1.3 mg/dL 0.7-1.3                       [facility]  

 

          SODIUM    138 mmol/L 136-145                       [facility]  

 

          POTASSIUM 4.4 mmol/L 3.5-5.1                       [facility]  

 

          CHLORIDE  102 mmol/L                         [facility]  

 

          CO2       31 mmol/L 21-32                         [facility]  

 

          CALCIUM   9.1 mg/dL 8.5-10.1                      [facility]  

 

          ANION GAP 5.0 mmol/L 5-12                          [facility]  

 

          GLOMERULAR FILTRATION RATE 59 mL/min                               [facility]  

 

                                        GFR IS CALCULATED IN mL/min/1.73m2 YONATHAN

L FUNCTION:                          

>90MILDLY DECREASED:                        60-89MILDY TO MODERATELY DECREASED: 
         45-59        MODERATELY TO SEVERELY DECREASED:        30-44SEVERELY 
DECREASED:                      15-29RENAL FAILURE:                            
<15 









                    ID                  Date                Data Source

 

                    [id number]   10/27/2020 08:08:00 AM EDT [facility]

l









          Name      Value     Range     Interpretation Code Description Data Janice

rce(s) Supporting 

Document(s)

 

          WHITE BLOOD COUNT 7.5 K/mm3 4.0-10.0                      [facility]

al  

 

          RED BLOOD COUNT 5.52 M/mm3 4.50-6.00                     [facility]  

 

          HEMOGLOBIN 16.2 gm/dL 14.0-18.0                     [facility]  

 

          HEMATOCRIT 48.5 %    42.0-54.0                     [facility]  

 

          MEAN CELL VOLUME 87.9 fl   80-96                         [facility]  

 

          MEAN CORPUSCULAR HEMOGLOBIN 29.3 pg   27.0-31.0                     [facility]  

 

          MEAN CORPUSCULAR HGB CONC 33.4 g/dl 32.0-36.0                     [facility]  

 

          RED CELL DISTRIBUTION WIDTH 13.1 %    10.0-14.5                     [facility]  

 

          PLATELET COUNT 200 K/mm3 172-450                       [facility] 

 

 

          MEAN PLATELET VOLUME 11.1 fl   9.0-13.0                      [facility]

pital  

 

          GRAN %    55.2 %    50-80.0                       [facility]  

 

          IG%       0.1 %     0.0-0.2                       [facility]  

 

          LYMPH %   33.1 %    25.0-50.0                     [facility]  

 

          MONO %    8.2 %     2.0-10.0                      [facility]  

 

          EOS %     2.9 %     0-5.0                         [facility]  

 

          BASO %    0.5 %     0.0-2.0                       [facility]  

 

          GRAN #    4.1 K/mm3 2.0-8.00                      [facility]  

 

          IG#       0.0 K/mm3 0.0-0.2                       [facility]  

 

          LYMPH #   2.5 K/mm3 1.0-5.0                       [facility]  

 

          MONO #    0.6 K/mm3 0.10-1.20                     [facility]  

 

          EOS #     0.2 K/mm3 0.0-0.5                       [facility]  

 

          BASO #    0.0 K/mm3 0.0-0.2                       [facility]  









                    ID                  Date                Data Source

 

                    [id number]   10/27/2020 07:29:00 AM EDT [facility]

l

 

                                        TSYSORDER [id number] 









          Name      Value     Range     Interpretation Code Description Data Janice

rce(s) Supporting 

Document(s)

 

          URINE RBC 3-5 /hpf  0-3       [facility]  

 

          URINE WBC 1-3 /hpf  0-5                           [facility]  

 

          URINE EPITHELIAL CELLS NONE SEEN /hpf 0                             [facility]  

 

          URINE BACTERIA TRACE     NONE SEEN [facility] 

 

 

          URINE MUCUS 2+        NEGATIVE  [facility]  









                    ID                  Date                Data Source

 

                    [id number] 10/27/2020 07:23:00 AM T [facility]

ital

 

                                        TSYSORDER [id number] 









          Name      Value     Range     Interpretation Code Description Data Janice

rce(s) Supporting 

Document(s)

 

          URINE COLOR. [facility]  

 

          URINE APPEARANCE CLEAR                                   [facility]

l  

 

          URINE GLUCOSE (UA) NEGATIVE mg/dL NEGATIVE                      [facility]  

 

          URINE BILIRUBIN NEGATIVE  NEGATIVE                      [facility]

  

 

          URINE KETONE NEGATIVE mg/dL NEGATIVE                      [facility]

al  

 

          SPECIFIC GRAVITY,URINE >1.030    1.001-1.035                     [facility]  

 

          URINE BLOOD TRACE     NEGATIVE  [facility]  

 

                                        10/27/20 0723:  BLOOD previously reporte

d as: TRACE   

 

          PH,URINE  6.0       5.0-9.0                       [facility]  

 

          URINE PROTEIN NEGATIVE mg/dL NEGATIVE                      [facility]

sarthak  

 

          URINE UROBILINOGEN NORMAL(0.2-1) mg/dL 0-1                           [facility]  

 

          URINE NITRATE NEGATIVE  NEGATIVE                      [facility]  

 

          URINE LEUKOCYTE ESTERASE NEGATIVE  NEGATIVE                      [facility]  









                    ID                  Date                Data Source

 

                    [id number]       10/12/2020 03:12:00 PM T [facility]

 

                                         Left Hip DATE OF EXAMINATION: 10/12/202

0 14:11 EDT HIP UNILATERAL COMPLETE 

INDICATION:   Pain COMPARISON:  None  TECHNIQUE:   2 views of the hip were 
obtained.  FINDINGS:  No fracture or dislocation.  The bone density appears 
normal with noevidence of lytic or blastic lesions.  The joint space appears 
normal.  IMPRESSION:  Negative exam.  Electronically signed in  by: Semaj Augustin M.D. 10/12/2020 15:06 EDT  









          Name      Value     Range     Interpretation Code Description Data Janice

rce(s) Supporting 

Document(s)

 

                                                                       









                    ID                  Date                Data Source

 

                    [id number]       10/12/2020 03:11:00 PM T [facility]

 

                                         Right Hip DATE OF EXAMINATION: 10/12/20

20 14:11 EDT HIP UNILATERAL COMPLETE 

INDICATION:   Pain COMPARISON:  None  TECHNIQUE:    2 views of the hip were 
obtained.  FINDINGS:  No fracture or dislocation.  The bone density appears 
normal with noevidence of lytic or blastic lesions.  The joint space appears 
normal.  IMPRESSION:  Negative exam.  Electronically signed in  by: Semaj Augustin M.D. 10/12/2020 15:05 EDT  









          Name      Value     Range     Interpretation Code Description Data Janice

rce(s) Supporting 

Document(s)

 

                                                                       









                    ID                  Date                Data Source

 

                    [id number]       10/12/2020 03:11:00 PM EDT [facility]

l

 

                                        LUMBAR SPINE DATE OF EXAMINATION: 10/12/

2020 14:11 EDT SPINE LUMBAR COMP 

INDICATION:  Pain  COMPARISON:  None  TECHNIQUE:  AP, lateral, bilateral oblique
views of the lumbar spine wereobtained . FINDINGS: There is no fracture or 
subluxation. There is degenerative disc spacenarrowing at T11-T12, T12-L1, L1-
L2. Small osteophytes at several levels.Paraspinal soft tissues are 
unremarkable. No evidence of pars defect orspondylolisthesis.   IMPRESSION:  
Degenerative changes lower thoracic and upper lumbar spine Electronically signed
in  by: Semaj Augustin M.D. 10/12/2020 15:05 EDT  









          Name      Value     Range     Interpretation Code Description Data Janice

rce(s) Supporting 

Document(s)

 

                                                                       









                    ID                  Date                Data Source

 

                    [id number]    10/08/2020 02:25:00 PM EDT [facility]

l









          Name      Value     Range     Interpretation Code Description Data Janice

rce(s) Supporting 

Document(s)

 

          CHOLESTEROL 204 mg/dL 0-200     H                   [facility]  

 

          TRIGLYCERIDES 172 mg/dL 0-150     H                   [facility]  

 

          LDL CHOLESTEROL 134 mg/dL 0-100     H                   [facility]

  

 

          HDL CHOLESTEROL 36 mg/dL  40-60     L                   [facility]

  

 

          CHOL/HDL RATIO 5.7       0.0-5.0   H                   [facility] 

 









                    ID                  Date                Data Source

 

                    [id number]    10/08/2020 02:25:00 PM EDT [facility]

l









          Name      Value     Range     Interpretation Code Description Data Janice

rce(s) Supporting 

Document(s)

 

          GLUCOSE   102 mg/dL                         [facility]  

 

          BLOOD UREA NITROGEN 16 mg/dL  7-18                          [facility]

ital  

 

          CREATININE 1.4 mg/dL 0.7-1.3   H                   [facility]  

 

          SODIUM    140 mmol/L 136-145                       [facility]  

 

          POTASSIUM 4.7 mmol/L 3.5-5.1                       [facility]  

 

          CHLORIDE  103 mmol/L                         [facility]  

 

          CO2       30 mmol/L 21-32                         [facility]  

 

          CALCIUM   9.5 mg/dL 8.5-10.1                      [facility]  

 

          ANION GAP 7.0 mmol/L 5-12                          [facility]  

 

          GLOMERULAR FILTRATION RATE 54 mL/min                               [facility]  

 

                                        GFR IS CALCULATED IN mL/min/1.73m2 YONATHAN

L FUNCTION:                          

>90MILDLY DECREASED:                        60-89MILDY TO MODERATELY DECREASED: 
         45-59        MODERATELY TO SEVERELY DECREASED:        30-44SEVERELY 
DECREASED:                      15-29RENAL FAILURE:                            
<15 

 

          AST       19 U/L    15-37                         [facility]  

 

          ALT       43 U/L    12-78                         [facility]  

 

          ALKALINE PHOSPHATASE 80 U/L                            [facility]  

 

          TOTAL BILIRUBIN 1.0 mg/dL 0.2-1.0                       [facility]

  

 

          TOTAL PROTEIN 8.0 g/dl  6.4-8.2                       [facility]  

 

          ALBUMIN   4.3 gm/dL 3.4-5.0                       [facility]  









                    ID                  Date                Data Source

 

                    LIPID PROFILE       10/08/2020 04:21:20 AM EDT eCW1 ([facility])









          Name      Value     Range     Interpretation Code Description Data Janice

rce(s) Supporting 

Document(s)

 

                    121       0-100               LDL CHOLESTEROL eCW1 ([facility])  

 

                    204                           CHOLESTEROL eCW1 ([facility])  

 

             Cholesterol in LDL [Mass/volume] in Serum or Plasma by calculation 

134                                    LDL 

CHOLESTEROL               eCW1 ([facility])  

 

                    36                            HDL CHOLESTEROL eCW1 ([facility])  

 

                    172                           TRIGLYCERIDES eCW1 ([facility])  

 

                    5.7                           CHOL/HDL RATIO eCW1 ([facility])  









                    ID                  Date                Data Source

 

                    [id number]       10/04/2020 07:15:00 AM EDT [facility]

l

 

                                          Patient: ADRIANNE DANNY Observation

 Report - Physicians/Mid Levels MRN: 

[id number].VisitID: [id number] [place] [address] [phone], MRegistration Date/Time: 10/04/2020 05:06 Weight:112 kg 
(S). Height/Length:72 inches (S). BMI:33.5        PAST 
HISTORYProblems:Bronchitis.Back Pain.Anxiety.Viral 
Disease.Tendonitis.Pharyngitis.Sinusitis.Gastroesophageal Reflux Disease.UTI - 
Urinary Tract Infection.Anxiety Reaction.Mental Illness.   Additional 
Surgeries:Cholecystectomy.   Medications:ClonazePAM Oral (Tablet 1 mg) 1 tablet,
  at bedtime, last dose  last night.Protonix Oral (Tablet Delayed Release 40 
mg),  daily, last dose  this am.SEROquel Oral (Tablet 25 mg) 1 tablet,  at 
bedtime, last dose  last night.Zoloft Oral (Tablet 100 mg) 1 tablet,  at 
bedtime, last dose  this am.  Allergies:No Known Environmental 
Allergies.Vicodin. (hallucinations).   FAMILY HISTORYNo significant family 
medical history.        (Electronically signed by Tyler Tatum P.A. 
10/04/2020 06:48)   









          Name      Value     Range     Interpretation Code Description Data [facility]

rce(s) Supporting 

Document(s)

 

                                                                       









                    ID                  Date                Data Source

 

                    [id number] [id number] 10/04/2020 05:23:00 AM [facility]

ital

 

                                        TSYSORDER [id number] 









          Name      Value     Range     Interpretation Code Description Data [facility]

rce(s) Supporting 

Document(s)

 

          URINE COLOR. [facility]  

 

          URINE APPEARANCE CLEAR                                   [facility]

l  

 

          SPECIFIC GRAVITY,URINE 1.020     1.001-1.035                     [facility]  

 

          URINE LEUKOCYTE ESTERASE NEGATIVE  NEGATIVE                      [facility]  

 

          URINE NITRATE NEGATIVE  NEGATIVE                      [facility]  

 

          PH,URINE  6.5       5.0-9.0                       [facility]  

 

          URINE PROTEIN NEGATIVE mg/dL NEGATIVE                      [facility]

[facility]  

 

          URINE GLUCOSE (UA) NEGATIVE mg/dL NEGATIVE                      [facility]  

 

          URINE KETONE NEGATIVE mg/dL NEGATIVE                      [facility]

al  

 

          URINE UROBILINOGEN NORMAL(0.2-1) mg/dL 0-1                           R

[facility]  

 

          URINE BILIRUBIN NEGATIVE  NEGATIVE                      [facility]

  

 

          URINE BLOOD NEGATIVE  NEGATIVE                      [facility]  









                    ID                  Date                Data Source

 

                    [id number]       2020 07:39:00 AM T [facility]

l

 

                                          Patient: ADRIANNEANITHA DICKSON J Observation

 Report - Physicians/Mid Levels MRN: 

[id number].Visit[facility]: [id number] [place] [address] [phone], MRegistration Date/Time: 2020 19:32 Weight:112 kg 
(S). Height/Length:72 inches (S). BMI:33.5        PAST HISTORYProblems:Back 
Pain.Bronchitis.Anxiety.Anxiety Reaction.Mental Illness.Tendonitis.Viral 
Disease.Pharyngitis.Sinusitis.   Additional Surgeries:Cholecystectomy.   
Medications:ClonazePAM Oral (Tablet 1 mg) 1 tablet,  at bedtime, last dose  last
 night.Protonix Oral (Tablet Delayed Release 40 mg),  daily, last dose  this 
am.SEROquel Oral (Tablet 25 mg) 1 tablet,  at bedtime, last dose  last 
night.Zoloft Oral (Tablet 100 mg) 1 tablet,  at bedtime, last dose  this am.  
Allergies:No Known Environmental Allergies.Vicodin. (hallucinations).   FAMILY 
HISTORYNegative. No significant family medical history.        (Electronically 
signed by RICCARDO Verma 2020 07:30)   









          Name      Value     Range     Interpretation Code Description Data [facility]

rce(s) Supporting 

Document(s)

 

                                                                       









                    ID                  Date                Data Source

 

                    [id number]   2020 11:24:00 AM EDT [facility]

sarthak









          Name      Value     Range     Interpretation Code Description Data [facility]

rce(s) Supporting 

Document(s)

 

          ORGANISM                                          [facility]  

 

          COLONY COUNT                     N                   [facility] 

 









                    ID                  Date                Data Source

 

                    [id number] 2020 06:05:00 AM EDT [facility]

al









          Name      Value     Range     Interpretation Code Description Data [facility]

rce(s) Supporting 

Document(s)

 

          CHLAMYDIA TRACHOMATIS, ARMANDO Negative  Negative                      [facility]  

 

          NEISSERIA GONORRHOEAE, ARMANDO Negative  Negative                      [facility]  

 

                                        Performed at:  RN - [facility] [place] [id number] 

Director: Araceli B Reyes MD, Phone:  [phone] 









                    ID                  Date                Data Source

 

                    [id number]         2020 06:05:00 AM EDT [facility]









          Name      Value     Range     Interpretation Code Description Data [facility]

rce(s) Supporting 

Document(s)

 

          Chlamydia/GC Amplification                                         Lab

Carlita    

 

                                           TESTS               RESULT  FLAG  UNI

TS    REF RANGE  

LAB------------------------------------------------------------C trachomatis, 
ARMANDO    Negative               (Negative)   01N gonorrhoeae, ARMANDO    Negative     
          (Negative)   
01------------------------------------------------------------    FLAG LEGEND:  
  L-Low Normal,H-High Normal,LL-Alert Low,HH-Alert High    <-Panic Low,>-Panic 
High,A-Abnormal,AA-Critical 
Abnormal------------------------------------------------------------Performed 
at:01 RN    [facility] [address]  [address]   Araceli B Reyes MD, Phone: [phone] 









                    ID                  Date                Data Source

 

                    [id number]   2020 08:15:00 PM T [facility]

l

 

                                        TSYSORDER [id number] 









          Name      Value     Range     Interpretation Code Description Data [facility]

rce(s) Supporting 

Document(s)

 

          URINE RBC 5-10 /hpf 0-3       H                   [facility]  

 

          URINE WBC 5-10 /hpf 0-5       H                   [facility]  

 

          URINE EPITHELIAL CELLS 2+ /hpf   0                             [facility]

ospital  

 

          URINE BACTERIA 3+        NONE SEEN                     [facility] 

 

 

                                        CULTURE ADDED TO SAMPLE. 









                    ID                  Date                Data Source

 

                    [id number] 2020 08:11:00 PM [facility]

ital

 

                                        TSYSORDER [id number] 









          Name      Value     Range     Interpretation Code Description Data [facility]

rce(s) Supporting 

Document(s)

 

          URINE COLOR. YELLOW                                  [facility]  

 

          URINE APPEARANCE SLIGHTY CLOUDY                               [facility]  

 

          SPECIFIC GRAVITY,URINE 1.015     1.001-1.035                     [facility]  

 

          URINE LEUKOCYTE ESTERASE 1+(SMALL) NEGATIVE  [facility]  

 

          URINE NITRATE NEGATIVE  NEGATIVE                      [facility]  

 

          PH,URINE  5.5       5.0-9.0                       [facility]  

 

          URINE PROTEIN TRACE mg/dL NEGATIVE  [facility]

  

 

          URINE GLUCOSE (UA) NEGATIVE mg/dL NEGATIVE                      [facility]  

 

          URINE KETONE NEGATIVE mg/dL NEGATIVE                      [facility]

al  

 

          URINE UROBILINOGEN NORMAL(0.2-1) mg/dL 0-1                           [facility]  

 

          URINE BILIRUBIN NEGATIVE  NEGATIVE                      [facility]

  

 

          URINE BLOOD 2+(MODERATE) NEGATIVE  [facility] 

 







                                        Procedure

 

                                          



                                                                                
                                                                                
                                                                                
                                                          



Vital Signs

          



                    ID                  Date                Data Source

 

                    UNK                                      









           Name       Value      Range      Interpretation Code Description Data

 Source(s)

 

           Respiratory rate 18 /min                          18 /min    eCW1 ([facility])

 

           Heart rate 66 /min                          66 /min    eCW1 ([facility])

 

           Body mass index (BMI) [Ratio] 42.82 kg/m2                       42.82

 kg/m2 eCW1 ([facility])

 

           Body weight 290 [lb_av]                       290 [lb_av] eCW1 ([facility])

 

           Body height 69 [in_i]                        69 [in_i]  eCW1 ([facility])









                    ID                  Date                Data Source

 

                    [id number]           2020 11:13:00 AM EST [facility]

l









           Name       Value      Range      Interpretation Code Description Data

 Source(s)

 

           WEIGHT     113.39 kilos                       113.39 kilos [facility]

ital

 

           HEIGHT     182.88 centimeters                       182.88 centimeter

[facility]

 

           WEIGHT     113.39 kilos                       113.39 kilos [facility]

ital

 

           HEIGHT     182.88 centimeters                       182.88 centimeter

[facility]









                    ID                  Date                Data Source

 

                    [id number]           2020 11:13:00 AM EST [facility]

l









           Name       Value      Range      Interpretation Code Description Data

 Source(s)

 

           WEIGHT     117.93 kilos                       117.93 kilos [facility]

ital

 

           HEIGHT     182.88 centimeters                       182.88 centimeter

[facility]

 

           WEIGHT     117.93 kilos                       117.93 kilos [facility]

ital

 

           HEIGHT     182.88 centimeters                       182.88 centimeter

[facility]



                                                                                
                                      



Patient Treatment Plan of Care

          



             Planned Activity Planned Date Details      Description  Data Source

(s)

 

             Lorazepam 0.5 MG Oral Tablet 2021 12:00:00 AM EST            

               eCW1 ([facility])

 

                          Tamsulosin hydrochloride 0.4 MG Oral Capsule [Flomax] 

10/08/2020 12:00:00 AM EDT

                                                            eCW1 ([facility])

 

             physical therapy eval and tx - 10/08/2020 12:00:00 AM EDT          

                 eCW1 ([facility])

 

             atorvastatin 20 MG Oral Tablet [Lipitor] 10/08/2020 12:00:00 AM EDT

                           eCW1 

([facility])

 

                          Tamsulosin hydrochloride 0.4 MG Oral Capsule [Flomax] 

10/08/2020 12:00:00 AM EDT

                                                            eCW1 ([facility])

 

             physical therapy eval and tx - 10/08/2020 12:00:00 AM EDT          

                 eCW1 ([facility])

 

             physical therapy eval and tx - 10/08/2020 12:00:00 AM EDT          

                 eCW1 ([facility])

## 2021-03-25 ENCOUNTER — HOSPITAL ENCOUNTER (OUTPATIENT)
Dept: HOSPITAL 53 - M SMT | Age: 46
End: 2021-03-25
Attending: NURSE PRACTITIONER
Payer: COMMERCIAL

## 2021-03-25 DIAGNOSIS — R35.0: Primary | ICD-10-CM

## 2021-03-25 LAB
APPEARANCE UR: CLEAR
BACTERIA UR QL AUTO: NEGATIVE
BILIRUB UR QL STRIP.AUTO: NEGATIVE
GLUCOSE UR QL STRIP.AUTO: NEGATIVE MG/DL
HGB UR QL STRIP.AUTO: (no result)
KETONES UR QL STRIP.AUTO: NEGATIVE MG/DL
LEUKOCYTE ESTERASE UR QL STRIP.AUTO: NEGATIVE
NITRITE UR QL STRIP.AUTO: NEGATIVE
PH UR STRIP.AUTO: 6 UNITS (ref 5–9)
PROT UR QL STRIP.AUTO: NEGATIVE MG/DL
RBC # UR AUTO: 0 /HPF (ref 0–3)
SP GR UR STRIP.AUTO: 1.01 (ref 1–1.03)
SQUAMOUS #/AREA URNS AUTO: 0 /HPF (ref 0–6)
UROBILINOGEN UR QL STRIP.AUTO: 0.2 MG/DL (ref 0–2)
WBC #/AREA URNS AUTO: 0 /HPF (ref 0–3)

## 2021-04-29 ENCOUNTER — HOSPITAL ENCOUNTER (OUTPATIENT)
Dept: HOSPITAL 53 - M PT | Age: 46
LOS: 1 days | End: 2021-04-30
Attending: NURSE PRACTITIONER
Payer: COMMERCIAL

## 2021-04-29 DIAGNOSIS — M54.30: Primary | ICD-10-CM

## 2021-05-27 ENCOUNTER — HOSPITAL ENCOUNTER (OUTPATIENT)
Dept: HOSPITAL 53 - M PT | Age: 46
LOS: 4 days | End: 2021-05-31
Attending: NURSE PRACTITIONER
Payer: COMMERCIAL

## 2021-05-27 DIAGNOSIS — M54.40: Primary | ICD-10-CM

## 2021-06-24 ENCOUNTER — HOSPITAL ENCOUNTER (OUTPATIENT)
Dept: HOSPITAL 53 - M LAB | Age: 46
End: 2021-06-24
Attending: UROLOGY
Payer: COMMERCIAL

## 2021-06-24 DIAGNOSIS — E29.1: Primary | ICD-10-CM

## 2021-06-24 LAB
FSH SERPL-ACNC: 4.5 MIU/ML (ref 1.4–18.1)
LH SERPL-ACNC: 4.3 MIU/ML (ref 1.5–9.3)

## 2021-06-25 LAB
TESTOST FREE SERPL-MCNC: 11.9 PG/ML (ref 6.8–21.5)
TESTOST SERPL-MCNC: 445 NG/DL (ref 264–916)

## 2021-07-08 ENCOUNTER — HOSPITAL ENCOUNTER (OUTPATIENT)
Dept: HOSPITAL 53 - M SMT | Age: 46
End: 2021-07-08
Attending: UROLOGY
Payer: COMMERCIAL

## 2021-07-08 DIAGNOSIS — R31.29: Primary | ICD-10-CM

## 2021-07-08 LAB
APPEARANCE UR: CLEAR
BACTERIA UR QL AUTO: NEGATIVE
BILIRUB UR QL STRIP.AUTO: NEGATIVE
GLUCOSE UR QL STRIP.AUTO: NEGATIVE MG/DL
HGB UR QL STRIP.AUTO: NEGATIVE
KETONES UR QL STRIP.AUTO: NEGATIVE MG/DL
LEUKOCYTE ESTERASE UR QL STRIP.AUTO: NEGATIVE
NITRITE UR QL STRIP.AUTO: NEGATIVE
PH UR STRIP.AUTO: 7 UNITS (ref 5–9)
PROT UR QL STRIP.AUTO: NEGATIVE MG/DL
RBC # UR AUTO: 0 /HPF (ref 0–3)
SP GR UR STRIP.AUTO: 1.01 (ref 1–1.03)
SQUAMOUS #/AREA URNS AUTO: 0 /HPF (ref 0–6)
UROBILINOGEN UR QL STRIP.AUTO: 0.2 MG/DL (ref 0–2)
WBC #/AREA URNS AUTO: 0 /HPF (ref 0–3)